# Patient Record
Sex: FEMALE | Race: WHITE | Employment: UNEMPLOYED | URBAN - METROPOLITAN AREA
[De-identification: names, ages, dates, MRNs, and addresses within clinical notes are randomized per-mention and may not be internally consistent; named-entity substitution may affect disease eponyms.]

---

## 2018-08-29 ENCOUNTER — OFFICE VISIT (OUTPATIENT)
Dept: FAMILY MEDICINE CLINIC | Age: 38
End: 2018-08-29

## 2018-08-29 ENCOUNTER — HOSPITAL ENCOUNTER (OUTPATIENT)
Dept: LAB | Age: 38
Discharge: HOME | End: 2018-08-29

## 2018-08-29 VITALS
HEIGHT: 62 IN | OXYGEN SATURATION: 98 % | WEIGHT: 206.2 LBS | TEMPERATURE: 98.3 F | RESPIRATION RATE: 16 BRPM | BODY MASS INDEX: 37.94 KG/M2 | HEART RATE: 84 BPM | SYSTOLIC BLOOD PRESSURE: 108 MMHG | DIASTOLIC BLOOD PRESSURE: 72 MMHG

## 2018-08-29 DIAGNOSIS — B00.1 RECURRENT COLD SORES: ICD-10-CM

## 2018-08-29 DIAGNOSIS — Z13.0 SCREENING FOR DEFICIENCY ANEMIA: ICD-10-CM

## 2018-08-29 DIAGNOSIS — R06.02 EXERCISE-INDUCED SHORTNESS OF BREATH: Primary | ICD-10-CM

## 2018-08-29 DIAGNOSIS — E03.4 HYPOTHYROIDISM DUE TO ACQUIRED ATROPHY OF THYROID: ICD-10-CM

## 2018-08-29 DIAGNOSIS — G43.009 MIGRAINE WITHOUT AURA AND WITHOUT STATUS MIGRAINOSUS, NOT INTRACTABLE: ICD-10-CM

## 2018-08-29 DIAGNOSIS — E78.5 HYPERLIPIDEMIA, UNSPECIFIED HYPERLIPIDEMIA TYPE: ICD-10-CM

## 2018-08-29 DIAGNOSIS — F17.200 SMOKING: ICD-10-CM

## 2018-08-29 DIAGNOSIS — R21 RASH: ICD-10-CM

## 2018-08-29 DIAGNOSIS — R73.01 IMPAIRED FASTING BLOOD SUGAR: ICD-10-CM

## 2018-08-29 DIAGNOSIS — R53.82 CHRONIC FATIGUE: ICD-10-CM

## 2018-08-29 DIAGNOSIS — Z23 NEED FOR PNEUMOCOCCAL VACCINATION: ICD-10-CM

## 2018-08-29 PROBLEM — E66.01 SEVERE OBESITY (BMI 35.0-39.9): Status: ACTIVE | Noted: 2018-08-29

## 2018-08-29 LAB — XX-LABCORP SPECIMEN COL,LCBCF: NORMAL

## 2018-08-29 PROCEDURE — 99001 SPECIMEN HANDLING PT-LAB: CPT | Performed by: FAMILY MEDICINE

## 2018-08-29 RX ORDER — ALBUTEROL SULFATE 90 UG/1
2 AEROSOL, METERED RESPIRATORY (INHALATION)
Qty: 1 INHALER | Refills: 2 | Status: SHIPPED | OUTPATIENT
Start: 2018-08-29 | End: 2019-09-30 | Stop reason: SDUPTHER

## 2018-08-29 RX ORDER — VALACYCLOVIR HYDROCHLORIDE 1 G/1
TABLET, FILM COATED ORAL
Qty: 20 TAB | Refills: 0 | Status: SHIPPED | OUTPATIENT
Start: 2018-08-29 | End: 2019-02-01 | Stop reason: SDUPTHER

## 2018-08-29 RX ORDER — TOPIRAMATE 25 MG/1
25 TABLET ORAL
Qty: 30 TAB | Refills: 2 | Status: SHIPPED | OUTPATIENT
Start: 2018-08-29 | End: 2019-05-08

## 2018-08-29 RX ORDER — EPINEPHRINE 0.3 MG/.3ML
0.3 INJECTION SUBCUTANEOUS
Qty: 1 SYRINGE | Refills: 0 | Status: SHIPPED | OUTPATIENT
Start: 2018-08-29 | End: 2018-08-29

## 2018-08-29 NOTE — MR AVS SNAPSHOT
[address] [id number] Patient: Johnathan Mora MRN:  [id number] Visit Information Date & Time Provider Department Dept. Phone Encounter #  
 8/29/2018  9:00 AM Melida Rodriguez, [facility] [phone] [id number] Follow-up Instructions Return in about 2 weeks (around 9/12/2018). Upcoming Health Maintenance Date Due Pneumococcal 19-64 Medium Risk (1 of 1 - PPSV23) 10/15/1999 PAP AKA CERVICAL CYTOLOGY 1/1/2016 Influenza Age 5 to Adult 8/1/2018 DTaP/Tdap/Td series (1 - Tdap) 6/2/2025* *Topic was postponed. The date shown is not the original due date. Allergies as of 8/29/2018  Review Complete On: 8/29/2018 By: Melida Rodriguez MD  
  
 Severity Noted Reaction Type Reactions Premarin [Conjugated Estrogens] Medium 03/18/2016   Systemic Rash Adhesive Tape-silicones  51/95/2786    Rash Mild rash/itching [place]  07/27/2017    Shortness of Breath SOB and wheezing Current Immunizations  Never Reviewed Name Date Influenza Vaccine 12/4/2013, 12/23/2011 12:00 AM  
 Influenza Vaccine Split 11/8/2012 Tdap 6/23/2015 12:00 AM, 12/23/2011 12:00 AM  
  
 Not reviewed this visit You Were Diagnosed With   
  
 Codes Comments Exercise-induced shortness of breath    -  Primary ICD-10-CM: R06.02 
ICD-9-CM: 786.05 Migraine without aura and without status migrainosus, not intractable     ICD-10-CM: W94.762 ICD-9-CM: 346.10 Recurrent cold sores     ICD-10-CM: B00.1 ICD-9-CM: 054.9 Hyperlipidemia, unspecified hyperlipidemia type     ICD-10-CM: E78.5 ICD-9-CM: 272.4 Impaired fasting blood sugar     ICD-10-CM: R73.01 
ICD-9-CM: 790.21 Screening for deficiency anemia     ICD-10-CM: Z13.0 ICD-9-CM: V78.1  Chronic fatigue     ICD-10-CM: R53.82 
ICD-9-CM: 780.79   
 Hypothyroidism due to acquired atrophy of thyroid     ICD-10-CM: E03.4 ICD-9-CM: 244.8, 246.8 Smoking     ICD-10-CM: F17.200 ICD-9-CM: 305.1 BMI 37.0-37.9, adult     ICD-10-CM: Z68.37 ICD-9-CM: V85.37 Rash     ICD-10-CM: R21 
ICD-9-CM: 782.1 both palm Vitals BP Pulse Temp Resp Height(growth percentile) Weight(growth percentile) 108/72 (BP 1 Location: Left arm, BP Patient Position: Sitting) 84 98.3 °F (36.8 °C) (Oral) 16 5' 2\" (1.575 m) 206 lb 3.2 oz (93.5 kg) SpO2 BMI OB Status Smoking Status 98% 37.71 kg/m2 IUD Current Every Day Smoker Vitals History BMI and BSA Data Body Mass Index Body Surface Area  
 37.71 kg/m 2 2.02 m 2 Preferred Pharmacy Pharmacy Name Phone [facility] [address] [facility], [facility] Your Updated Medication List  
  
   
This list is accurate as of 8/29/18  9:26 AM.  Always use your most recent med list.  
  
  
  
  
 albuterol 90 mcg/actuation inhaler Commonly known as:  PROVENTIL HFA, VENTOLIN HFA, PROAIR HFA Take 2 Puffs by inhalation every four (4) hours as needed for Wheezing. atorvastatin 20 mg tablet Commonly known as:  LIPITOR Take 1 Tab by mouth daily. butalbital-acetaminophen-caffeine -40 mg per tablet Commonly known as:  Federica Reasoner Take 1 Tab by mouth every four (4) hours as needed for Pain. cyclobenzaprine 10 mg tablet Commonly known as:  FLEXERIL Take 1 Tab by mouth nightly as needed for Muscle Spasm(s). EPINEPHrine 0.3 mg/0.3 mL injection Commonly known as:  EPIPEN  
0.3 mL by IntraMUSCular route once as needed for up to 1 dose. ibuprofen 400 mg tablet Commonly known as:  MOTRIN Take 1 Tab by mouth every eight (8) hours as needed for Pain.  
  
 levothyroxine 100 mcg tablet Commonly known as:  SYNTHROID Take 1 Tab by mouth Daily (before breakfast).   
  
 MIRENA 20 mcg/24 hr (5 years) Iud  
 Generic drug:  levonorgestrel 1 Each by IntraUTERine route once. predniSONE 20 mg tablet Commonly known as:  DELTASONE  
3 tabs by mouth once daily for 3 days 2 tabs by mouth once daily for 3 days 1 tab by mouth once daily for 3 days  
  
 topiramate 25 mg tablet Commonly known as:  TOPAMAX Take 1 Tab by mouth daily (with breakfast). TYLENOL ARTHRITIS PAIN 650 mg Boneta Erp Generic drug:  acetaminophen Take 650 mg by mouth every six (6) hours as needed for Pain. valACYclovir 1 gram tablet Commonly known as:  VALTREX  
1 gm daily for 3 days with acute episode of cold sore Prescriptions Sent to Pharmacy Refills  
 albuterol (PROVENTIL HFA, VENTOLIN HFA, PROAIR HFA) 90 mcg/actuation inhaler 2 Sig: Take 2 Puffs by inhalation every four (4) hours as needed for Wheezing. Class: Normal  
 Pharmacy: [address] Ph #: [phone] Route: Inhalation EPINEPHrine (EPIPEN) 0.3 mg/0.3 mL injection 0 Si.3 mL by IntraMUSCular route once as needed for up to 1 dose. Class: Normal  
 Pharmacy: [address] Ph #: [phone] Route: IntraMUSCular  
 topiramate (TOPAMAX) 25 mg tablet 2 Sig: Take 1 Tab by mouth daily (with breakfast). Class: Normal  
 Pharmacy: [address] Ph #: [phone] Route: Oral  
 valACYclovir (VALTREX) 1 gram tablet 0 Si gm daily for 3 days with acute episode of cold sore Class: Normal  
 Pharmacy: [address] Ph #: [phone] We Performed the Following REFERRAL TO DERMATOLOGY [REF19 Custom] Follow-up Instructions Return in about 2 weeks (around 2018). To-Do List   
 2018 Lab:  CBC W/O DIFF   
  
 2018 Lab:  HEMOGLOBIN A1C WITH EAG   
  
 2018 Lab:  LIPID PANEL   
  
 2018 Lab: METABOLIC PANEL, COMPREHENSIVE   
  
 08/29/2018 Lab:  T4, FREE   
  
 08/29/2018 Lab:  TSH 3RD GENERATION   
  
 08/29/2018 Lab:  VITAMIN D, 25 HYDROXY Referral Information Referral ID Referred By Referred To  
  
 [id number] Bonny Poole. [facility] [address] [place], [facility] [address] Phone: [phone] Fax: [phone] Visits Status Start Date End Date 1 New Request 8/29/18 8/29/19 If your referral has a status of pending review or denied, additional information will be sent to support the outcome of this decision. Patient Instructions Stopping Smoking: Care Instructions Your Care Instructions Cigarette smokers crave the nicotine in cigarettes. Giving it up is much harder than simply changing a habit. Your body has to stop craving the nicotine. It is hard to quit, but you can do it. There are many tools that people use to quit smoking. You may find that combining tools works best for you. There are several steps to quitting. First you get ready to quit. Then you get support to help you. After that, you learn new skills and behaviors to become a nonsmoker. For many people, a necessary step is getting and using medicine. Your doctor will help you set up the plan that best meets your needs. You may want to attend a smoking cessation program to help you quit smoking. When you choose a program, look for one that has proven success. Ask your doctor for ideas. You will greatly increase your chances of success if you take medicine as well as get counseling or join a cessation program. 
Some of the changes you feel when you first quit tobacco are uncomfortable. Your body will miss the nicotine at first, and you may feel short-tempered and grumpy. You may have trouble sleeping or concentrating. Medicine can help you deal with these symptoms.  You may struggle with changing your smoking habits and rituals. The last step is the tricky one: Be prepared for the smoking urge to continue for a time. This is a lot to deal with, but keep at it. You will feel better. Follow-up care is a key part of your treatment and safety. Be sure to make and go to all appointments, and call your doctor if you are having problems. It's also a good idea to know your test results and keep a list of the medicines you take. How can you care for yourself at home? · Ask your family, friends, and coworkers for support. You have a better chance of quitting if you have help and support. · Join a support group, such as Nicotine Anonymous, for people who are trying to quit smoking. · Consider signing up for a smoking cessation program, such as the American Lung Association's Freedom from Smoking program. 
· Get text messaging support. Go to the website at www.smokefree. gov to sign up for the [facility] program. 
· Set a quit date. Pick your date carefully so that it is not right in the middle of a big deadline or stressful time. Once you quit, do not even take a puff. Get rid of all ashtrays and lighters after your last cigarette. Clean your house and your clothes so that they do not smell of smoke. · Learn how to be a nonsmoker. Think about ways you can avoid those things that make you reach for a cigarette. ¨ Avoid situations that put you at greatest risk for smoking. For some people, it is hard to have a drink with friends without smoking. For others, they might skip a coffee break with coworkers who smoke. ¨ Change your daily routine. Take a different route to work or eat a meal in a different place. · Cut down on stress. Calm yourself or release tension by doing an activity you enjoy, such as reading a book, taking a hot bath, or gardening. · Talk to your doctor or pharmacist about nicotine replacement therapy, which replaces the nicotine in your body.  You still get nicotine but you do not use tobacco. Nicotine replacement products help you slowly reduce the amount of nicotine you need. These products come in several forms, many of them available over-the-counter: ¨ Nicotine patches ¨ Nicotine gum and lozenges ¨ Nicotine inhaler · Ask your doctor about bupropion (Wellbutrin) or varenicline (Chantix), which are prescription medicines. They do not contain nicotine. They help you by reducing withdrawal symptoms, such as stress and anxiety. · Some people find hypnosis, acupuncture, and massage helpful for ending the smoking habit. · Eat a healthy diet and get regular exercise. Having healthy habits will help your body move past its craving for nicotine. · Be prepared to keep trying. Most people are not successful the first few times they try to quit. Do not get mad at yourself if you smoke again. Make a list of things you learned and think about when you want to try again, such as next week, next month, or next year. Where can you learn more? Go to http://sachin-sae.info/. Enter G897 in the search box to learn more about \"Stopping Smoking: Care Instructions. \" Current as of: November 29, 2017 Content Version: 11.7 © 8411-0930 [employer]. Care instructions adapted under license by [employer] (which disclaims liability or warranty for this information). If you have questions about a medical condition or this instruction, always ask your healthcare professional. [address] any warranty or liability for your use of this information. Prediabetes: Care Instructions Your Care Instructions Prediabetes is a warning sign that you are at risk for getting type 2 diabetes. It means that your blood sugar is higher than it should be. The food you eat turns into sugar, which your body uses for energy.  Normally, an organ called the pancreas makes insulin, which allows the sugar in your blood to get into your body's cells. But when your body can't use insulin the right way, the sugar doesn't move into cells. It stays in your blood instead. This is called insulin resistance. The buildup of sugar in the blood causes prediabetes. The good news is that lifestyle changes may help you get your blood sugar back to normal and help you avoid or delay diabetes. Follow-up care is a key part of your treatment and safety. Be sure to make and go to all appointments, and call your doctor if you are having problems. It's also a good idea to know your test results and keep a list of the medicines you take. How can you care for yourself at home? · Watch your weight. A healthy weight helps your body use insulin properly. · Limit the amount of calories, sweets, and unhealthy fat you eat. Ask your doctor if you should see a dietitian. A registered dietitian can help you create meal plans that fit your lifestyle. · Get at least 30 minutes of exercise on most days of the week. Exercise helps control your blood sugar. It also helps you maintain a healthy weight. Walking is a good choice. You also may want to do other activities, such as running, swimming, cycling, or playing tennis or team sports. · Do not smoke. Smoking can make prediabetes worse. If you need help quitting, talk to your doctor about stop-smoking programs and medicines. These can increase your chances of quitting for good. · If your doctor prescribed medicines, take them exactly as prescribed. Call your doctor if you think you are having a problem with your medicine. You will get more details on the specific medicines your doctor prescribes. When should you call for help? Watch closely for changes in your health, and be sure to contact your doctor if: 
  · You have any symptoms of diabetes. These may include: ¨ Being thirsty more often. ¨ Urinating more. ¨ Being hungrier. ¨ Losing weight. ¨ Being very tired. ¨ Having blurry vision.   · You have a wound that will not heal.  
  · You have an infection that will not go away.  
  · You have problems with your blood pressure.  
  · You want more information about diabetes and how you can keep from getting it. Where can you learn more? Go to http://sachin-sae.info/. Enter I222 in the search box to learn more about \"Prediabetes: Care Instructions. \" Current as of: December 7, 2017 Content Version: 11.7 © 9923-4976 [employer]. Care instructions adapted under license by [employer] (which disclaims liability or warranty for this information). If you have questions about a medical condition or this instruction, always ask your healthcare professional. [address] any warranty or liability for your use of this information. Learning About Low-Fat Eating What is low-fat eating? Most food has some fat in it. Your body needs some fat to be healthy. But some kinds of fats are healthier than others. In a low-fat eating plan, you try to choose healthier fats and eat fewer unhealthy fats. Healthy fats include olive and canola oil. Try to avoid eating too much saturated fat (such as in cheese and meats) and trans fat (a type of fat found in many packaged snack foods and other baked goods). You do not need to cut all fat from your diet. But you can make healthier choices about the types and amount of fat you eat. Even though it is a good idea to choose healthier fats, it is still important to be careful of how much fat you eat, because all fats are high in calories. What are the different types of fats? Unhealthy fats · Saturated fat. Saturated fats are mostly in animal foods, such as meat and dairy foods. Tropical oils, such as coconut oil, palm oil, and cocoa butter, are also saturated fats. · Trans fat.  Trans fats include shortening, partially hydrogenated vegetable oils, and hydrogenated vegetable oils. Trans fats are made when a liquid fat is turned into a solid fat (for example, when corn oil is made into stick margarine). They are in many processed foods, such as cookies, crackers, and snack foods. Healthy fats · Monounsaturated fat. Monounsaturated fats are liquid at room temperature but get solid when refrigerated. Eating foods that are high in this fat may help lower your \"bad\" (LDL) cholesterol, keep your \"good\" (HDL) cholesterol level up, and lower your chances of getting coronary artery disease. This fat is found in canola oil, olive oil, peanut oil, olives, avocados, nuts, and nut butters. · Polyunsaturated fat. Polyunsaturated fats are liquid at room temperature. They are in safflower, sunflower, and corn oils. They are also the main fat in seafood. Omega-3 fatty acids are types of polyunsaturated fat. Eating fish may lower your chances of getting coronary artery disease. Fatty fish such as salmon and mackerel contain these healthy fatty acids. So do ground flaxseeds and flaxseed oil, soybeans, walnuts, and seeds. Why cut down on unhealthy fats? Eating foods that contain saturated fats can raise the LDL (\"bad\") cholesterol in your blood. Having a high level of LDL cholesterol increases your chance of hardening of the arteries (atherosclerosis), which can lead to heart disease, heart attack, and stroke. Trans fat raises the level of \"bad\" LDL cholesterol in your blood and may lower the \"good\" HDL cholesterol in your blood. Trans fat can raise your risk of heart disease, heart attack, and stroke. In general: · No more than 10% of your daily calories should come from saturated fat. This is about 20 grams in a 2,000-calorie diet. · No more than 10% of your daily calories should come from polyunsaturated fat. This is about 20 grams in a 2,000-calorie diet. · Monounsaturated fats can be up to 15% of your daily calories.  This is about 25 to 30 grams in a 2,000-calorie diet. If you're not sure how much fat you should be eating or how many calories you need each day to stay at a healthy weight, talk to a registered dietitian. He or she can help you create a plan that's right for you. What can you do to cut down on fat? Foods like cheese, butter, sausage, and desserts can have a lot of unhealthy fats. Try these tips for healthier meals at home and when you eat out. At home · Fill up on fruits, vegetables, and whole grains. · Think of meat as a side dish instead of as the main part of your meal. 
· When you do eat meat, make it extra-lean ground beef (97% lean), ground turkey breast (without skin added), meats with fat trimmed off before cooking, or skinless chicken. · Try main dishes that use whole wheat pasta, brown rice, dried beans, or vegetables. · Use cooking methods that use little or no fat, such as broiling, steaming, or grilling. Use cooking spray instead of oil. If you use oil, use a monounsaturated oil, such as canola or olive oil. · Read food labels on canned, bottled, or packaged foods. Choose those with little saturated fat and no trans fat. When eating out at a restaurant · Order foods that are broiled or poached instead of fried or breaded. · Cut back on the amount of butter or margarine that you use on bread. Use small amounts of olive oil instead. · Order sauces, gravies, and salad dressings on the side, and use only a little. · When you order pasta, choose tomato sauce instead of cream sauce. · Ask for salsa with your baked potato instead of sour cream, butter, cheese, or art. Where can you learn more? Go to http://sachin-sae.info/. Enter M034 in the search box to learn more about \"Learning About Low-Fat Eating. \" Current as of: May 12, 2017 Content Version: 11.7 © 3343-9287 Aniways[employer] Incorporated.  Care instructions adapted under license by [address] (which disclaims liability or warranty for this information). If you have questions about a medical condition or this instruction, always ask your healthcare professional. [address] any warranty or liability for your use of this information. Hypothyroidism: Care Instructions Your Care Instructions You have hypothyroidism, which means that your body is not making enough thyroid hormone. This hormone helps your body use energy. If your thyroid level is low, you may feel tired, be constipated, have an increase in your blood pressure, or have dry skin or memory problems. You may also get cold easily, even when it is warm. Women with low thyroid levels may have heavy menstrual periods. A blood test to find your thyroid-stimulating hormone (TSH) level is used to check for hypothyroidism. A high TSH level may mean that you have low thyroid. When your body is not making enough thyroid hormone, TSH levels rise in an effort to make the body produce more. The treatment for hypothyroidism is to take thyroid hormone pills. You should start to feel better in 1 to 2 weeks. But it can take several months to see changes in the TSH level. You will need regular visits with your doctor to make sure you have the right dose of medicine. Most people need treatment for the rest of their lives. You will need to see your doctor regularly to have blood tests and to make sure you are doing well. Follow-up care is a key part of your treatment and safety. Be sure to make and go to all appointments, and call your doctor if you are having problems. It's also a good idea to know your test results and keep a list of the medicines you take. How can you care for yourself at home? · Take your thyroid hormone medicine exactly as prescribed. Call your doctor if you think you are having a problem with your medicine.  Most people do not have side effects if they take the right amount of medicine regularly. ¨ Take the medicine 30 minutes before breakfast, and do not take it with calcium, vitamins, or iron. ¨ Do not take extra doses of your thyroid medicine. It will not help you get better any faster, and it may cause side effects. ¨ If you forget to take a dose, do NOT take a double dose of medicine. Take your usual dose the next day. · Tell your doctor about all prescription, herbal, or over-the-counter products you take. · Take care of yourself. Eat a healthy diet, get enough sleep, and get regular exercise. When should you call for help? Call 911 anytime you think you may need emergency care. For example, call if: 
  · You passed out (lost consciousness).  
  · You have severe trouble breathing.  
  · You have a very slow heartbeat (less than 60 beats a minute).  
  · You have a low body temperature (95°F or below).  
 Call your doctor now or seek immediate medical care if: 
  · You feel tired, sluggish, or weak.  
  · You have trouble remembering things or concentrating.  
  · You do not begin to feel better 2 weeks after starting your medicine.  
 Watch closely for changes in your health, and be sure to contact your doctor if you have any problems. Where can you learn more? Go to http://sachin-sae.info/. Enter O569 in the search box to learn more about \"Hypothyroidism: Care Instructions. \" Current as of: May 12, 2017 Content Version: 11.7 © 4894-1255 [employer], Incorporated. Care instructions adapted under license by [employer] (which disclaims liability or warranty for this information). If you have questions about a medical condition or this instruction, always ask your healthcare professional. [address] [address] any warranty or liability for your use of this information. Introducing [facility]! Alayna Orourke introduces Platogo patient portal. Now you can access parts of your medical record, email your doctor's office, and request medication refills online. 1. In your internet browser, go to [email or web address]://[employer] ([employer]). [employer]) 2. Click on the First Time User? Click Here link in the Sign In box. You will see the New Member Sign Up page. 3. Enter your Platogo Access Code exactly as it appears below. You will not need to use this code after youve completed the sign-up process. If you do not sign up before the expiration date, you must request a new code. · Platogo Access Code: [email or web address] Expires: 11/27/2018  9:04 AM 
 
4. Enter the last four digits of your Social Security Number (xxxx) and Date of Birth (mm/dd/yyyy) as indicated and click Submit. You will be taken to the next sign-up page. 5. Create a Platogo ID. This will be your Platogo login ID and cannot be changed, so think of one that is secure and easy to remember. 6. Create a Platogo password. You can change your password at any time. 7. Enter your Password Reset Question and Answer. This can be used at a later time if you forget your password. 8. Enter your e-mail address. You will receive e-mail notification when new information is available in [address] 9. Click Sign Up. You can now view and download portions of your medical record. 10. Click the Download Summary menu link to download a portable copy of your medical information. If you have questions, please visit the Frequently Asked Questions section of the Platogo website. Remember, Platogo is NOT to be used for urgent needs. For medical emergencies, dial 911. Now available from your iPhone and Android! Please provide this summary of care documentation to your next provider. Your primary care clinician is listed as Shanice Chery. If you have any questions after today's visit, please call [phone].

## 2018-08-29 NOTE — PATIENT INSTRUCTIONS
Stopping Smoking: Care Instructions Your Care Instructions Cigarette smokers crave the nicotine in cigarettes. Giving it up is much harder than simply changing a habit. Your body has to stop craving the nicotine. It is hard to quit, but you can do it. There are many tools that people use to quit smoking. You may find that combining tools works best for you. There are several steps to quitting. First you get ready to quit. Then you get support to help you. After that, you learn new skills and behaviors to become a nonsmoker. For many people, a necessary step is getting and using medicine. Your doctor will help you set up the plan that best meets your needs. You may want to attend a smoking cessation program to help you quit smoking. When you choose a program, look for one that has proven success. Ask your doctor for ideas. You will greatly increase your chances of success if you take medicine as well as get counseling or join a cessation program. 
Some of the changes you feel when you first quit tobacco are uncomfortable. Your body will miss the nicotine at first, and you may feel short-tempered and grumpy. You may have trouble sleeping or concentrating. Medicine can help you deal with these symptoms. You may struggle with changing your smoking habits and rituals. The last step is the tricky one: Be prepared for the smoking urge to continue for a time. This is a lot to deal with, but keep at it. You will feel better. Follow-up care is a key part of your treatment and safety. Be sure to make and go to all appointments, and call your doctor if you are having problems. It's also a good idea to know your test results and keep a list of the medicines you take. How can you care for yourself at home? · Ask your family, friends, and coworkers for support. You have a better chance of quitting if you have help and support.  
· Join a support group, such as Nicotine Anonymous, for people who are trying to quit smoking. · Consider signing up for a smoking cessation program, such as the American Lung Association's Freedom from Smoking program. 
· Get text messaging support. Go to the website at www.smokefree. gov to sign up for the [facility] program. 
· Set a quit date. Pick your date carefully so that it is not right in the middle of a big deadline or stressful time. Once you quit, do not even take a puff. Get rid of all ashtrays and lighters after your last cigarette. Clean your house and your clothes so that they do not smell of smoke. · Learn how to be a nonsmoker. Think about ways you can avoid those things that make you reach for a cigarette. ¨ Avoid situations that put you at greatest risk for smoking. For some people, it is hard to have a drink with friends without smoking. For others, they might skip a coffee break with coworkers who smoke. ¨ Change your daily routine. Take a different route to work or eat a meal in a different place. · Cut down on stress. Calm yourself or release tension by doing an activity you enjoy, such as reading a book, taking a hot bath, or gardening. · Talk to your doctor or pharmacist about nicotine replacement therapy, which replaces the nicotine in your body. You still get nicotine but you do not use tobacco. Nicotine replacement products help you slowly reduce the amount of nicotine you need. These products come in several forms, many of them available over-the-counter: ¨ Nicotine patches ¨ Nicotine gum and lozenges ¨ Nicotine inhaler · Ask your doctor about bupropion (Wellbutrin) or varenicline (Chantix), which are prescription medicines. They do not contain nicotine. They help you by reducing withdrawal symptoms, such as stress and anxiety. · Some people find hypnosis, acupuncture, and massage helpful for ending the smoking habit. · Eat a healthy diet and get regular exercise. Having healthy habits will help your body move past its craving for nicotine. · Be prepared to keep trying. Most people are not successful the first few times they try to quit. Do not get mad at yourself if you smoke again. Make a list of things you learned and think about when you want to try again, such as next week, next month, or next year. Where can you learn more? Go to http://sachin-sae.info/. Enter N052 in the search box to learn more about \"Stopping Smoking: Care Instructions. \" Current as of: November 29, 2017 Content Version: 11.7 © 4859-1574 [employer]. Care instructions adapted under license by [employer] (which disclaims liability or warranty for this information). If you have questions about a medical condition or this instruction, always ask your healthcare professional. [address] any warranty or liability for your use of this information. Prediabetes: Care Instructions Your Care Instructions Prediabetes is a warning sign that you are at risk for getting type 2 diabetes. It means that your blood sugar is higher than it should be. The food you eat turns into sugar, which your body uses for energy. Normally, an organ called the pancreas makes insulin, which allows the sugar in your blood to get into your body's cells. But when your body can't use insulin the right way, the sugar doesn't move into cells. It stays in your blood instead. This is called insulin resistance. The buildup of sugar in the blood causes prediabetes. The good news is that lifestyle changes may help you get your blood sugar back to normal and help you avoid or delay diabetes. Follow-up care is a key part of your treatment and safety. Be sure to make and go to all appointments, and call your doctor if you are having problems. It's also a good idea to know your test results and keep a list of the medicines you take. How can you care for yourself at home? · Watch your weight.  A healthy weight helps your body use insulin properly. · Limit the amount of calories, sweets, and unhealthy fat you eat. Ask your doctor if you should see a dietitian. A registered dietitian can help you create meal plans that fit your lifestyle. · Get at least 30 minutes of exercise on most days of the week. Exercise helps control your blood sugar. It also helps you maintain a healthy weight. Walking is a good choice. You also may want to do other activities, such as running, swimming, cycling, or playing tennis or team sports. · Do not smoke. Smoking can make prediabetes worse. If you need help quitting, talk to your doctor about stop-smoking programs and medicines. These can increase your chances of quitting for good. · If your doctor prescribed medicines, take them exactly as prescribed. Call your doctor if you think you are having a problem with your medicine. You will get more details on the specific medicines your doctor prescribes. When should you call for help? Watch closely for changes in your health, and be sure to contact your doctor if: 
  · You have any symptoms of diabetes. These may include: ¨ Being thirsty more often. ¨ Urinating more. ¨ Being hungrier. ¨ Losing weight. ¨ Being very tired. ¨ Having blurry vision.  
  · You have a wound that will not heal.  
  · You have an infection that will not go away.  
  · You have problems with your blood pressure.  
  · You want more information about diabetes and how you can keep from getting it. Where can you learn more? Go to http://sachin-sae.info/. Enter I222 in the search box to learn more about \"Prediabetes: Care Instructions. \" Current as of: December 7, 2017 Content Version: 11.7 © 4108-6188 [email or web address]. Care instructions adapted under license by [employer] (which disclaims liability or warranty for this information).  If you have questions about a medical condition or this instruction, always ask your healthcare professional. [place] [address] any warranty or liability for your use of this information. Learning About Low-Fat Eating What is low-fat eating? Most food has some fat in it. Your body needs some fat to be healthy. But some kinds of fats are healthier than others. In a low-fat eating plan, you try to choose healthier fats and eat fewer unhealthy fats. Healthy fats include olive and canola oil. Try to avoid eating too much saturated fat (such as in cheese and meats) and trans fat (a type of fat found in many packaged snack foods and other baked goods). You do not need to cut all fat from your diet. But you can make healthier choices about the types and amount of fat you eat. Even though it is a good idea to choose healthier fats, it is still important to be careful of how much fat you eat, because all fats are high in calories. What are the different types of fats? Unhealthy fats · Saturated fat. Saturated fats are mostly in animal foods, such as meat and dairy foods. Tropical oils, such as coconut oil, palm oil, and cocoa butter, are also saturated fats. · Trans fat. Trans fats include shortening, partially hydrogenated vegetable oils, and hydrogenated vegetable oils. Trans fats are made when a liquid fat is turned into a solid fat (for example, when corn oil is made into stick margarine). They are in many processed foods, such as cookies, crackers, and snack foods. Healthy fats · Monounsaturated fat. Monounsaturated fats are liquid at room temperature but get solid when refrigerated. Eating foods that are high in this fat may help lower your \"bad\" (LDL) cholesterol, keep your \"good\" (HDL) cholesterol level up, and lower your chances of getting coronary artery disease. This fat is found in canola oil, olive oil, peanut oil, olives, avocados, nuts, and nut butters. · Polyunsaturated fat.  Polyunsaturated fats are liquid at room temperature. They are in safflower, sunflower, and corn oils. They are also the main fat in seafood. Omega-3 fatty acids are types of polyunsaturated fat. Eating fish may lower your chances of getting coronary artery disease. Fatty fish such as salmon and mackerel contain these healthy fatty acids. So do ground flaxseeds and flaxseed oil, soybeans, walnuts, and seeds. Why cut down on unhealthy fats? Eating foods that contain saturated fats can raise the LDL (\"bad\") cholesterol in your blood. Having a high level of LDL cholesterol increases your chance of hardening of the arteries (atherosclerosis), which can lead to heart disease, heart attack, and stroke. Trans fat raises the level of \"bad\" LDL cholesterol in your blood and may lower the \"good\" HDL cholesterol in your blood. Trans fat can raise your risk of heart disease, heart attack, and stroke. In general: · No more than 10% of your daily calories should come from saturated fat. This is about 20 grams in a 2,000-calorie diet. · No more than 10% of your daily calories should come from polyunsaturated fat. This is about 20 grams in a 2,000-calorie diet. · Monounsaturated fats can be up to 15% of your daily calories. This is about 25 to 30 grams in a 2,000-calorie diet. If you're not sure how much fat you should be eating or how many calories you need each day to stay at a healthy weight, talk to a registered dietitian. He or she can help you create a plan that's right for you. What can you do to cut down on fat? Foods like cheese, butter, sausage, and desserts can have a lot of unhealthy fats. Try these tips for healthier meals at home and when you eat out. At home · Fill up on fruits, vegetables, and whole grains.  
· Think of meat as a side dish instead of as the main part of your meal. 
· When you do eat meat, make it extra-lean ground beef (97% lean), ground turkey breast (without skin added), meats with fat trimmed off before cooking, or skinless chicken. · Try main dishes that use whole wheat pasta, brown rice, dried beans, or vegetables. · Use cooking methods that use little or no fat, such as broiling, steaming, or grilling. Use cooking spray instead of oil. If you use oil, use a monounsaturated oil, such as canola or olive oil. · Read food labels on canned, bottled, or packaged foods. Choose those with little saturated fat and no trans fat. When eating out at a restaurant · Order foods that are broiled or poached instead of fried or breaded. · Cut back on the amount of butter or margarine that you use on bread. Use small amounts of olive oil instead. · Order sauces, gravies, and salad dressings on the side, and use only a little. · When you order pasta, choose tomato sauce instead of cream sauce. · Ask for salsa with your baked potato instead of sour cream, butter, cheese, or art. Where can you learn more? Go to http://sachin-sae.info/. Enter V263 in the search box to learn more about \"Learning About Low-Fat Eating. \" Current as of: May 12, 2017 Content Version: 11.7 © 7650-8725 [employer]. Care instructions adapted under license by [employer] (which disclaims liability or warranty for this information). If you have questions about a medical condition or this instruction, always ask your healthcare professional. [place] [address] any warranty or liability for your use of this information. Hypothyroidism: Care Instructions Your Care Instructions You have hypothyroidism, which means that your body is not making enough thyroid hormone. This hormone helps your body use energy. If your thyroid level is low, you may feel tired, be constipated, have an increase in your blood pressure, or have dry skin or memory problems. You may also get cold easily, even when it is warm. Women with low thyroid levels may have heavy menstrual periods. A blood test to find your thyroid-stimulating hormone (TSH) level is used to check for hypothyroidism. A high TSH level may mean that you have low thyroid. When your body is not making enough thyroid hormone, TSH levels rise in an effort to make the body produce more. The treatment for hypothyroidism is to take thyroid hormone pills. You should start to feel better in 1 to 2 weeks. But it can take several months to see changes in the TSH level. You will need regular visits with your doctor to make sure you have the right dose of medicine. Most people need treatment for the rest of their lives. You will need to see your doctor regularly to have blood tests and to make sure you are doing well. Follow-up care is a key part of your treatment and safety. Be sure to make and go to all appointments, and call your doctor if you are having problems. It's also a good idea to know your test results and keep a list of the medicines you take. How can you care for yourself at home? · Take your thyroid hormone medicine exactly as prescribed. Call your doctor if you think you are having a problem with your medicine. Most people do not have side effects if they take the right amount of medicine regularly. ¨ Take the medicine 30 minutes before breakfast, and do not take it with calcium, vitamins, or iron. ¨ Do not take extra doses of your thyroid medicine. It will not help you get better any faster, and it may cause side effects. ¨ If you forget to take a dose, do NOT take a double dose of medicine. Take your usual dose the next day. · Tell your doctor about all prescription, herbal, or over-the-counter products you take. · Take care of yourself. Eat a healthy diet, get enough sleep, and get regular exercise. When should you call for help? Call 911 anytime you think you may need emergency care. For example, call if: 
  · You passed out (lost consciousness).  
  · You have severe trouble breathing.   · You have a very slow heartbeat (less than 60 beats a minute).  
  · You have a low body temperature (95°F or below).  
 Call your doctor now or seek immediate medical care if: 
  · You feel tired, sluggish, or weak.  
  · You have trouble remembering things or concentrating.  
  · You do not begin to feel better 2 weeks after starting your medicine.  
 Watch closely for changes in your health, and be sure to contact your doctor if you have any problems. Where can you learn more? Go to http://sachin-sae.info/. Enter G808 in the search box to learn more about \"Hypothyroidism: Care Instructions. \" Current as of: May 12, 2017 Content Version: 11.7 © 3685-0641 [employer]. Care instructions adapted under license by [employer] (which disclaims liability or warranty for this information). If you have questions about a medical condition or this instruction, always ask your healthcare professional. [place] [address] any warranty or liability for your use of this information. Pneumococcal Polysaccharide Vaccine: Care Instructions Your Care Instructions The pneumococcal polysaccharide vaccine (PPSV) can prevent some of the serious complications of pneumonia. This includes infection in the bloodstream (bacteremia) or throughout the body (septicemia). PPSV is recommended for people ages 72 years and older. People ages 3 to 59 who have a long-term illness should also get the vaccine. This includes people with diabetes, heart disease, liver disease, or lung disease. PPSV can also help people who have a weakened immune system. This includes cancer patients and people who don't have a spleen. The immune system helps your body fight infection and other illnesses. PPSV is given as a shot. It's usually given in the arm. Healthy older adults get the shot once. Other people may need to have a second dose.  The shot may cause pain and redness at the site. It may also cause a mild fever for a short time. Follow-up care is a key part of your treatment and safety. Be sure to make and go to all appointments, and call your doctor if you are having problems. It's also a good idea to know your test results and keep a list of the medicines you take. How can you care for yourself at home? · Take an over-the-counter pain medicine, such as acetaminophen (Tylenol), ibuprofen (Advil, Motrin), or naproxen (Aleve), if your arm is sore after the shot. Be safe with medicines. Read and follow all instructions on the label. · Give acetaminophen (Tylenol) or ibuprofen (Advil, Motrin) to your child for pain or fussiness after the shot. Read and follow all instructions on the label. Do not give aspirin to anyone younger than 20. It has been linked to Reye syndrome, a serious illness. · Put ice or a cold pack on the sore area for 10 to 20 minutes at a time. Put a thin cloth between the ice and your skin. When should you call for help? Call 911 anytime you think you may need emergency care. For example, call if: 
  · You have a seizure.  
  · You have symptoms of a severe allergic reaction. These may include: 
¨ Sudden raised, red areas (hives) all over the body. ¨ Swelling of the throat, mouth, lips, or tongue. ¨ Trouble breathing. ¨ Passing out (losing consciousness). Or you may feel very lightheaded or suddenly feel weak, confused, or restless.  
 Call your doctor now or seek immediate medical care if: 
  · You have symptoms of an allergic reaction, such as: ¨ A rash or hives (raised, red areas on the skin). ¨ Itching. ¨ Swelling. ¨ Belly pain, nausea, or vomiting.  
  · You have a high fever.  
 Watch closely for changes in your health, and be sure to contact your doctor if you have any problems. Where can you learn more? Go to http://sachin-sae.info/. Enter T225 in the search box to learn more about \"Pneumococcal Polysaccharide Vaccine: Care Instructions. \" Current as of: October 6, 2017 Content Version: 11.7 © 6213-3338 [employer], [employer]. Care instructions adapted under license by [employer] (which disclaims liability or warranty for this information). If you have questions about a medical condition or this instruction, always ask your healthcare professional. [place] [address] any warranty or liability for your use of this information.

## 2018-08-29 NOTE — PROGRESS NOTES
1. Have you been to the ER, urgent care clinic since your last visit? Hospitalized since your last visit? [facility] feet; told it was hand/foot/mouth disease 3/2018. She still has problems with bumps on hands and feet. 2. Have you seen or consulted any other health care providers outside of the [facility] since your last visit? Include any pap smears or colon screening. [facility] 2/2018 for IUD placement. Patient states her IUD is not Mirena but another brand; she's not sure what brand. Last pap smear 2/2018 - [facility] - normal exam as per patient. Patient has not had flu vaccine. Patient has not had a pneumonia vaccine.

## 2018-08-29 NOTE — PROGRESS NOTES
HISTORY OF PRESENT ILLNESS Rosemary Bauman is a 40 y.o. female. HPI: not seen pt since 2016. H.o exercise induce breathing discomfort. Using albuterol as needed. No wheezing or cough. No excessive use of albuterol. Does smoke. Not ready to quit at this time. H/o migraine headache. Topamax was helping. Wanted to restart and need refill. No side effect of medication. H./o recurrent cold sore and needed valacyclovir refill. H/o hyperlipidemia. Not on any medication. discussed high BMI. Discussed diet modification, calorie count and exercise. Discussed importance of weight loss. agree to do exercise and life style modification. Diet and exercise hand out given in AVS. Also h/o prediabetes. Repeat labs. Discussed diet modification. H./o hypothyroidism. Has stopped medication since long time. Now feeling fatigue , weight gain, hair loss and skin dryness. Wanted to recheck labs. No mood changes. Visit Vitals  /72 (BP 1 Location: Left arm, BP Patient Position: Sitting)  Pulse 84  Temp 98.3 °F (36.8 °C) (Oral)  Resp 16  
 Ht 5' 2\" (1.575 m)  Wt 206 lb 3.2 oz (93.5 kg)  SpO2 98%  BMI 37.71 kg/m2 H/o rash over both palm since last couple of months. Went to urgent care and was told it is hand foot and mouth disease and was given antibiotic. I have discuss with her that it is usually viral infection. currently has scattered rash over both palm and some dry skin. Will consider sending to dermatology. ROS: see HPI Physical Exam  
Constitutional: She is oriented to person, place, and time. No distress. Neck: No thyromegaly present. Cardiovascular: Normal rate, regular rhythm and normal heart sounds. Pulmonary/Chest: CTA Abdominal: Soft. Bowel sounds are normal. There is no tenderness. Musculoskeletal: She exhibits no edema. Lymphadenopathy:  
  She has no cervical adenopathy. Neurological: She is oriented to person, place, and time. Skin: Rash (erythmatous rash over both palm , non tender , no itching. no open skin ) noted. Psychiatric: Her behavior is normal.  
 
 
ASSESSMENT and PLAN 
  ICD-10-CM ICD-9-CM 1. Exercise-induced shortness of breath: discussed smoking cessation. Given albuterol refill. R06.02 786.05 albuterol (PROVENTIL HFA, VENTOLIN HFA, PROAIR HFA) 90 mcg/actuation inhaler 2. Migraine without aura and without status migrainosus, not intractable: restarted topamax. Discussed medication side effects. G43.009 346.10 topiramate (TOPAMAX) 25 mg tablet METABOLIC PANEL, COMPREHENSIVE 3. Recurrent cold sores: given refill on medication to take as needed. B00.1 054.9 valACYclovir (VALTREX) 1 gram tablet METABOLIC PANEL, COMPREHENSIVE 4. Hyperlipidemia, unspecified hyperlipidemia type: recheck labs. Z39.7 982.8 METABOLIC PANEL, COMPREHENSIVE  
   LIPID PANEL  
5. Impaired fasting blood sugar: recheck labs. G73.61 244.66 METABOLIC PANEL, COMPREHENSIVE  
   HEMOGLOBIN A1C WITH EAG 6. Screening for deficiency anemia Z13.0 V78.1 CBC W/O DIFF 7. Chronic fatigue: checking labs. R53.82 780.79 CBC W/O DIFF  
   VITAMIN D, 25 HYDROXY 8. Hypothyroidism due to acquired atrophy of thyroid: off medication since more than a year. Symptomatic. Will consider restarting medication after lab result. E03.4 244.8 TSH 3RD GENERATION  
  246.8 T4, FREE 9. Smoking: done counseling for smoking cessation. She will think about it. F17.200 305.1 PNEUMOCOCCAL POLYSACCHARIDE VACCINE, 23-VALENT, ADULT OR IMMUNOSUPPRESSED PT DOSE, 10. BMI 37.0-37.9, adult: discussed high BMI. Discussed diet modification, calorie count and exercise. Discussed importance of weight loss. agree to do exercise and life style modification. Diet and exercise hand out given in AVS. Z68.37 V85.37   
11. Rash: probably hand foot and mouth disease. Sending to dermatology . R21 782.1 REFERRAL TO DERMATOLOGY  
 both palm 12. Need for pneumococcal vaccination Z23 V03.82 PNEUMOCOCCAL POLYSACCHARIDE VACCINE, 23-VALENT, ADULT OR IMMUNOSUPPRESSED PT DOSE, Pt understood and agree with the plan Review HM Follow-up Disposition: 
Return in about 2 weeks (around 9/12/2018).

## 2018-08-30 DIAGNOSIS — R79.89 ELEVATED TSH: Primary | ICD-10-CM

## 2018-08-30 DIAGNOSIS — E03.4 HYPOTHYROIDISM DUE TO ACQUIRED ATROPHY OF THYROID: ICD-10-CM

## 2018-08-30 LAB
25(OH)D3+25(OH)D2 SERPL-MCNC: 21.1 NG/ML (ref 30–100)
ALBUMIN SERPL-MCNC: 4.5 G/DL (ref 3.5–5.5)
ALBUMIN/GLOB SERPL: 1.7 {RATIO} (ref 1.2–2.2)
ALP SERPL-CCNC: 81 IU/L (ref 39–117)
ALT SERPL-CCNC: 17 IU/L (ref 0–32)
AST SERPL-CCNC: 22 IU/L (ref 0–40)
BILIRUB SERPL-MCNC: 0.3 MG/DL (ref 0–1.2)
BUN SERPL-MCNC: 12 MG/DL (ref 6–20)
BUN/CREAT SERPL: 18 (ref 9–23)
CALCIUM SERPL-MCNC: 9.3 MG/DL (ref 8.7–10.2)
CHLORIDE SERPL-SCNC: 102 MMOL/L (ref 96–106)
CHOLEST SERPL-MCNC: 255 MG/DL (ref 100–199)
CO2 SERPL-SCNC: 21 MMOL/L (ref 20–29)
CREAT SERPL-MCNC: 0.68 MG/DL (ref 0.57–1)
ERYTHROCYTE [DISTWIDTH] IN BLOOD BY AUTOMATED COUNT: 13.9 % (ref 12.3–15.4)
EST. AVERAGE GLUCOSE BLD GHB EST-MCNC: 123 MG/DL
GLOBULIN SER CALC-MCNC: 2.6 G/DL (ref 1.5–4.5)
GLUCOSE SERPL-MCNC: 98 MG/DL (ref 65–99)
HBA1C MFR BLD: 5.9 % (ref 4.8–5.6)
HCT VFR BLD AUTO: 43.4 % (ref 34–46.6)
HDLC SERPL-MCNC: 28 MG/DL
HGB BLD-MCNC: 14.3 G/DL (ref 11.1–15.9)
INTERPRETATION, 910389: NORMAL
LDLC SERPL CALC-MCNC: 170 MG/DL (ref 0–99)
MCH RBC QN AUTO: 29.3 PG (ref 26.6–33)
MCHC RBC AUTO-ENTMCNC: 32.9 G/DL (ref 31.5–35.7)
MCV RBC AUTO: 89 FL (ref 79–97)
PLATELET # BLD AUTO: 275 X10E3/UL (ref 150–379)
POTASSIUM SERPL-SCNC: 4.6 MMOL/L (ref 3.5–5.2)
PROT SERPL-MCNC: 7.1 G/DL (ref 6–8.5)
RBC # BLD AUTO: 4.88 X10E6/UL (ref 3.77–5.28)
SODIUM SERPL-SCNC: 139 MMOL/L (ref 134–144)
T4 FREE SERPL-MCNC: 0.83 NG/DL (ref 0.82–1.77)
TRIGL SERPL-MCNC: 283 MG/DL (ref 0–149)
TSH SERPL DL<=0.005 MIU/L-ACNC: 5.92 UIU/ML (ref 0.45–4.5)
VLDLC SERPL CALC-MCNC: 57 MG/DL (ref 5–40)
WBC # BLD AUTO: 7.3 X10E3/UL (ref 3.4–10.8)

## 2018-08-30 RX ORDER — LEVOTHYROXINE SODIUM 25 UG/1
25 TABLET ORAL
Qty: 30 TAB | Refills: 1 | Status: SHIPPED | OUTPATIENT
Start: 2018-08-30 | End: 2018-10-30 | Stop reason: SDUPTHER

## 2018-08-30 NOTE — PROGRESS NOTES
Let pt know that elevated triglyceride and now restarted statin. Will continue current plan and further discussion on follow up visit in 2 wks. Please advise pt to keep an appt. Also elevated TSH. Need to restart synthroid low dose. Will restart it and again repeat labs in 6 wks. Prediabetes and will discuss further on follow up visit. Thanks.

## 2018-09-05 NOTE — PROGRESS NOTES
Contacted patient and verified identity using name and date of birth (2- identifiers) Spoke with patient and she verbalized understanding of elevated triglycerides (with recent statin restart) and further discussion at follow-up appt. Restart Levothyroxine and repeat labs in 6 weeks.

## 2018-09-17 ENCOUNTER — OFFICE VISIT (OUTPATIENT)
Dept: FAMILY MEDICINE CLINIC | Age: 38
End: 2018-09-17

## 2018-09-17 VITALS
SYSTOLIC BLOOD PRESSURE: 116 MMHG | RESPIRATION RATE: 16 BRPM | HEIGHT: 62 IN | HEART RATE: 84 BPM | OXYGEN SATURATION: 98 % | BODY MASS INDEX: 37.47 KG/M2 | TEMPERATURE: 98.8 F | DIASTOLIC BLOOD PRESSURE: 70 MMHG | WEIGHT: 203.6 LBS

## 2018-09-17 DIAGNOSIS — R73.01 IMPAIRED FASTING BLOOD SUGAR: ICD-10-CM

## 2018-09-17 DIAGNOSIS — M25.561 RIGHT KNEE PAIN, UNSPECIFIED CHRONICITY: ICD-10-CM

## 2018-09-17 DIAGNOSIS — E78.5 HYPERLIPIDEMIA, UNSPECIFIED HYPERLIPIDEMIA TYPE: ICD-10-CM

## 2018-09-17 DIAGNOSIS — E03.9 HYPOTHYROIDISM, UNSPECIFIED TYPE: ICD-10-CM

## 2018-09-17 DIAGNOSIS — E55.9 VITAMIN D DEFICIENCY: ICD-10-CM

## 2018-09-17 DIAGNOSIS — Z23 ENCOUNTER FOR IMMUNIZATION: ICD-10-CM

## 2018-09-17 DIAGNOSIS — R51.9 FREQUENT HEADACHES: Primary | ICD-10-CM

## 2018-09-17 RX ORDER — ERGOCALCIFEROL 1.25 MG/1
50000 CAPSULE ORAL
Qty: 12 CAP | Refills: 0 | Status: SHIPPED | OUTPATIENT
Start: 2018-09-17 | End: 2019-02-01 | Stop reason: SDUPTHER

## 2018-09-17 NOTE — PROGRESS NOTES
1. Have you been to the ER, urgent care clinic since your last visit? Hospitalized since your last visit? No    2. Have you seen or consulted any other health care providers outside of the [address] [place] [address] since your last visit? Include any pap smears or colon screening. No    [facility]/2018 [facility]    Patient requests flu vaccine today.

## 2018-09-17 NOTE — PATIENT INSTRUCTIONS
High Cholesterol: Care Instructions  Your Care Instructions    Cholesterol is a type of fat in your blood. It is needed for many body functions, such as making new cells. Cholesterol is made by your body. It also comes from food you eat. High cholesterol means that you have too much of the fat in your blood. This raises your risk of a heart attack and stroke. LDL and HDL are part of your total cholesterol. LDL is the \"bad\" cholesterol. High LDL can raise your risk for heart disease, heart attack, and stroke. HDL is the \"good\" cholesterol. It helps clear bad cholesterol from the body. High HDL is linked with a lower risk of heart disease, heart attack, and stroke. Your cholesterol levels help your doctor find out your risk for having a heart attack or stroke. You and your doctor can talk about whether you need to lower your risk and what treatment is best for you. A heart-healthy lifestyle along with medicines can help lower your cholesterol and your risk. The way you choose to lower your risk will depend on how high your risk is for heart attack and stroke. It will also depend on how you feel about taking medicines. Follow-up care is a key part of your treatment and safety. Be sure to make and go to all appointments, and call your doctor if you are having problems. It's also a good idea to know your test results and keep a list of the medicines you take. How can you care for yourself at home? · Eat a variety of foods every day. Good choices include fruits, vegetables, whole grains (like oatmeal), dried beans and peas, nuts and seeds, soy products (like tofu), and fat-free or low-fat dairy products. · Replace butter, margarine, and hydrogenated or partially hydrogenated oils with olive and canola oils. (Canola oil margarine without trans fat is fine.)  · Replace red meat with fish, poultry, and soy protein (like tofu). · Limit processed and packaged foods like chips, crackers, and cookies.   · Bake, broil, or steam foods. Don't melendez them. · Be physically active. Get at least 30 minutes of exercise on most days of the week. Walking is a good choice. You also may want to do other activities, such as running, swimming, cycling, or playing tennis or team sports. · Stay at a healthy weight or lose weight by making the changes in eating and physical activity listed above. Losing just a small amount of weight, even 5 to 10 pounds, can reduce your risk for having a heart attack or stroke. · Do not smoke. When should you call for help? Watch closely for changes in your health, and be sure to contact your doctor if:    · You need help making lifestyle changes.     · You have questions about your medicine. Where can you learn more? Go to http://sachin-sae.info/. Enter M410 in the search box to learn more about \"High Cholesterol: Care Instructions. \"  Current as of: May 10, 2017  Content Version: 11.7  © 4884-0070 [employer]. Care instructions adapted under license by [employer] (which disclaims liability or warranty for this information). If you have questions about a medical condition or this instruction, always ask your healthcare professional. [address] any warranty or liability for your use of this information. Prediabetes: Care Instructions  Your Care Instructions    Prediabetes is a warning sign that you are at risk for getting type 2 diabetes. It means that your blood sugar is higher than it should be. The food you eat turns into sugar, which your body uses for energy. Normally, an organ called the pancreas makes insulin, which allows the sugar in your blood to get into your body's cells. But when your body can't use insulin the right way, the sugar doesn't move into cells. It stays in your blood instead. This is called insulin resistance. The buildup of sugar in the blood causes prediabetes.   The good news is that lifestyle changes may help you get your blood sugar back to normal and help you avoid or delay diabetes. Follow-up care is a key part of your treatment and safety. Be sure to make and go to all appointments, and call your doctor if you are having problems. It's also a good idea to know your test results and keep a list of the medicines you take. How can you care for yourself at home? · Watch your weight. A healthy weight helps your body use insulin properly. · Limit the amount of calories, sweets, and unhealthy fat you eat. Ask your doctor if you should see a dietitian. A registered dietitian can help you create meal plans that fit your lifestyle. · Get at least 30 minutes of exercise on most days of the week. Exercise helps control your blood sugar. It also helps you maintain a healthy weight. Walking is a good choice. You also may want to do other activities, such as running, swimming, cycling, or playing tennis or team sports. · Do not smoke. Smoking can make prediabetes worse. If you need help quitting, talk to your doctor about stop-smoking programs and medicines. These can increase your chances of quitting for good. · If your doctor prescribed medicines, take them exactly as prescribed. Call your doctor if you think you are having a problem with your medicine. You will get more details on the specific medicines your doctor prescribes. When should you call for help? Watch closely for changes in your health, and be sure to contact your doctor if:    · You have any symptoms of diabetes. These may include:  ¨ Being thirsty more often. ¨ Urinating more. ¨ Being hungrier. ¨ Losing weight. ¨ Being very tired. ¨ Having blurry vision.     · You have a wound that will not heal.     · You have an infection that will not go away.     · You have problems with your blood pressure.     · You want more information about diabetes and how you can keep from getting it. Where can you learn more?   Go to http://sachin-sae.info/. Enter I222 in the search box to learn more about \"Prediabetes: Care Instructions. \"  Current as of: December 7, 2017  Content Version: 11.7  © 2006-2018 [employer]. Care instructions adapted under license by [employer] (which disclaims liability or warranty for this information). If you have questions about a medical condition or this instruction, always ask your healthcare professional. [address] any warranty or liability for your use of this information. Learning About Low-Fat Eating  What is low-fat eating? Most food has some fat in it. Your body needs some fat to be healthy. But some kinds of fats are healthier than others. In a low-fat eating plan, you try to choose healthier fats and eat fewer unhealthy fats. Healthy fats include olive and canola oil. Try to avoid eating too much saturated fat (such as in cheese and meats) and trans fat (a type of fat found in many packaged snack foods and other baked goods). You do not need to cut all fat from your diet. But you can make healthier choices about the types and amount of fat you eat. Even though it is a good idea to choose healthier fats, it is still important to be careful of how much fat you eat, because all fats are high in calories. What are the different types of fats? Unhealthy fats  · Saturated fat. Saturated fats are mostly in animal foods, such as meat and dairy foods. Tropical oils, such as coconut oil, palm oil, and cocoa butter, are also saturated fats. · Trans fat. Trans fats include shortening, partially hydrogenated vegetable oils, and hydrogenated vegetable oils. Trans fats are made when a liquid fat is turned into a solid fat (for example, when corn oil is made into stick margarine). They are in many processed foods, such as cookies, crackers, and snack foods. Healthy fats  · Monounsaturated fat.  Monounsaturated fats are liquid at room temperature but get solid when refrigerated. Eating foods that are high in this fat may help lower your \"bad\" (LDL) cholesterol, keep your \"good\" (HDL) cholesterol level up, and lower your chances of getting coronary artery disease. This fat is found in canola oil, olive oil, peanut oil, olives, avocados, nuts, and nut butters. · Polyunsaturated fat. Polyunsaturated fats are liquid at room temperature. They are in safflower, sunflower, and corn oils. They are also the main fat in seafood. Omega-3 fatty acids are types of polyunsaturated fat. Eating fish may lower your chances of getting coronary artery disease. Fatty fish such as salmon and mackerel contain these healthy fatty acids. So do ground flaxseeds and flaxseed oil, soybeans, walnuts, and seeds. Why cut down on unhealthy fats? Eating foods that contain saturated fats can raise the LDL (\"bad\") cholesterol in your blood. Having a high level of LDL cholesterol increases your chance of hardening of the arteries (atherosclerosis), which can lead to heart disease, heart attack, and stroke. Trans fat raises the level of \"bad\" LDL cholesterol in your blood and may lower the \"good\" HDL cholesterol in your blood. Trans fat can raise your risk of heart disease, heart attack, and stroke. In general:  · No more than 10% of your daily calories should come from saturated fat. This is about 20 grams in a 2,000-calorie diet. · No more than 10% of your daily calories should come from polyunsaturated fat. This is about 20 grams in a 2,000-calorie diet. · Monounsaturated fats can be up to 15% of your daily calories. This is about 25 to 30 grams in a 2,000-calorie diet. If you're not sure how much fat you should be eating or how many calories you need each day to stay at a healthy weight, talk to a registered dietitian. He or she can help you create a plan that's right for you. What can you do to cut down on fat?   Foods like cheese, butter, sausage, and desserts can have a lot of unhealthy fats. Try these tips for healthier meals at home and when you eat out. At home  · Fill up on fruits, vegetables, and whole grains. · Think of meat as a side dish instead of as the main part of your meal.  · When you do eat meat, make it extra-lean ground beef (97% lean), ground turkey breast (without skin added), meats with fat trimmed off before cooking, or skinless chicken. · Try main dishes that use whole wheat pasta, brown rice, dried beans, or vegetables. · Use cooking methods that use little or no fat, such as broiling, steaming, or grilling. Use cooking spray instead of oil. If you use oil, use a monounsaturated oil, such as canola or olive oil. · Read food labels on canned, bottled, or packaged foods. Choose those with little saturated fat and no trans fat. When eating out at a restaurant  · Order foods that are broiled or poached instead of fried or breaded. · Cut back on the amount of butter or margarine that you use on bread. Use small amounts of olive oil instead. · Order sauces, gravies, and salad dressings on the side, and use only a little. · When you order pasta, choose tomato sauce instead of cream sauce. · Ask for salsa with your baked potato instead of sour cream, butter, cheese, or art. Where can you learn more? Go to http://sachin-sae.info/. Enter I355 in the search box to learn more about \"Learning About Low-Fat Eating. \"  Current as of: May 12, 2017  Content Version: 11.7  © 0580-1532 [employer]. Care instructions adapted under license by [employer] (which disclaims liability or warranty for this information). If you have questions about a medical condition or this instruction, always ask your healthcare professional. [place] [address] any warranty or liability for your use of this information.        Learning About Low-Carbohydrate Diets for Weight Loss  What is a low-carbohydrate diet?  Low-carb diets avoid foods that are high in carbohydrate. These high-carb foods include pasta, bread, rice, cereal, fruits, and starchy vegetables. Instead, these diets usually have you eat foods that are high in fat and protein. Many people lose weight quickly on a low-carb diet. But the early weight loss is water. People on this diet often gain the weight back after they start eating carbs again. Not all diet plans are safe or work well. A lot of the evidence shows that low-carb diets aren't healthy. That's because these diets often don't include healthy foods like fruits and vegetables. Losing weight safely means balancing protein, fat, and carbs with every meal and snack. And low-carb diets don't always provide the vitamins, minerals, and fiber you need. If you have a serious medical condition, talk to your doctor before you try any diet. These conditions include kidney disease, heart disease, type 2 diabetes, high cholesterol, and high blood pressure. If you are pregnant, it may not be safe for your baby if you are on a low-carb diet. How can you lose weight safely? You might have heard that a diet plan helped another person lose weight. But that doesn't mean that it will work for you. It is very hard to stay on a diet that includes lots of big changes in your eating habits. If you want to get to a healthy weight and stay there, making healthy lifestyle changes will often work better than dieting. These steps can help. · Make a plan for change. Work with your doctor to create a plan that is right for you. · See a dietitian. He or she can show you how to make healthy changes in your eating habits. · Manage stress. If you have a lot of stress in your life, it can be hard to focus on making healthy changes to your daily habits. · Track your food and activity. You are likely to do better at losing weight if you keep track of what you eat and what you do.   Follow-up care is a key part of your treatment and safety. Be sure to make and go to all appointments, and call your doctor if you are having problems. It's also a good idea to know your test results and keep a list of the medicines you take. Where can you learn more? Go to http://sachin-sae.info/. Enter A121 in the search box to learn more about \"Learning About Low-Carbohydrate Diets for Weight Loss. \"  Current as of: May 12, 2017  Content Version: 11.7  © 9882-2300 [employer]. Care instructions adapted under license by [employer] (which disclaims liability or warranty for this information). If you have questions about a medical condition or this instruction, always ask your healthcare professional. [address] any warranty or liability for your use of this information. Migraine Headache: Care Instructions  Your Care Instructions  Migraines are painful, throbbing headaches that often start on one side of the head. They may cause nausea and vomiting and make you sensitive to light, sound, or smell. Without treatment, migraines can last from 4 hours to a few days. Medicines can help prevent migraines or stop them after they have started. Your doctor can help you find which ones work best for you. Follow-up care is a key part of your treatment and safety. Be sure to make and go to all appointments, and call your doctor if you are having problems. It's also a good idea to know your test results and keep a list of the medicines you take. How can you care for yourself at home? · Do not drive if you have taken a prescription pain medicine. · Rest in a quiet, dark room until your headache is gone. Close your eyes, and try to relax or go to sleep. Don't watch TV or read. · Put a cold, moist cloth or cold pack on the painful area for 10 to 20 minutes at a time. Put a thin cloth between the cold pack and your skin.   · Use a warm, moist towel or a heating pad set on low to relax tight shoulder and neck muscles. · Have someone gently massage your neck and shoulders. · Take your medicines exactly as prescribed. Call your doctor if you think you are having a problem with your medicine. You will get more details on the specific medicines your doctor prescribes. · Be careful not to take pain medicine more often than the instructions allow. You could get worse or more frequent headaches when the medicine wears off. To prevent migraines  · Keep a headache diary so you can figure out what triggers your headaches. Avoiding triggers may help you prevent headaches. Record when each headache began, how long it lasted, and what the pain was like. (Was it throbbing, aching, stabbing, or dull?) Write down any other symptoms you had with the headache, such as nausea, flashing lights or dark spots, or sensitivity to bright light or loud noise. Note if the headache occurred near your period. List anything that might have triggered the headache. Triggers may include certain foods (chocolate, cheese, wine) or odors, smoke, bright light, stress, or lack of sleep. · If your doctor has prescribed medicine for your migraines, take it as directed. You may have medicine that you take only when you get a migraine and medicine that you take all the time to help prevent migraines. ¨ If your doctor has prescribed medicine for when you get a headache, take it at the first sign of a migraine, unless your doctor has given you other instructions. ¨ If your doctor has prescribed medicine to prevent migraines, take it exactly as prescribed. Call your doctor if you think you are having a problem with your medicine. · Find healthy ways to deal with stress. Migraines are most common during or right after stressful times. Take time to relax before and after you do something that has caused a migraine in the past.  · Try to keep your muscles relaxed by keeping good posture.  Check your jaw, face, neck, and shoulder muscles for tension. Try to relax them. When you sit at a desk, change positions often. And make sure to stretch for 30 seconds each hour. · Get plenty of sleep and exercise. · Eat meals on a regular schedule. Avoid foods and drinks that often trigger migraines. These include chocolate, alcohol (especially red wine and port), aspartame, monosodium glutamate (MSG), and some additives found in foods (such as hot dogs, art, cold cuts, aged cheeses, and pickled foods). · Limit caffeine. Don't drink too much coffee, tea, or soda. But don't quit caffeine suddenly. That can also give you migraines. · Do not smoke or allow others to smoke around you. If you need help quitting, talk to your doctor about stop-smoking programs and medicines. These can increase your chances of quitting for good. · If you are taking birth control pills or hormone therapy, talk to your doctor about whether they are triggering your migraines. When should you call for help? Call 911 anytime you think you may need emergency care. For example, call if:    · You have signs of a stroke. These may include:  ¨ Sudden numbness, paralysis, or weakness in your face, arm, or leg, especially on only one side of your body. ¨ Sudden vision changes. ¨ Sudden trouble speaking. ¨ Sudden confusion or trouble understanding simple statements. ¨ Sudden problems with walking or balance. ¨ A sudden, severe headache that is different from past headaches.    Call your doctor now or seek immediate medical care if:    · You have new or worse nausea and vomiting.     · You have a new or higher fever.     · Your headache gets much worse.    Watch closely for changes in your health, and be sure to contact your doctor if:    · You are not getting better after 2 days (48 hours). Where can you learn more? Go to http://sachin-sae.info/. Enter Q513 in the search box to learn more about \"Migraine Headache: Care Instructions. \"  Current as of: October 9, 2017  Content Version: 11.7  © 2771-0389 [employer]. Care instructions adapted under license by [employer] (which disclaims liability or warranty for this information). If you have questions about a medical condition or this instruction, always ask your healthcare professional. [address] any warranty or liability for your use of this information. Influenza (Flu) Vaccine (Inactivated or Recombinant): What You Need to Know  Why get vaccinated? Influenza (\"flu\") is a contagious disease that spreads around the [place] every winter, usually between October and May. Flu is caused by influenza viruses and is spread mainly by coughing, sneezing, and close contact. Anyone can get flu. Flu strikes suddenly and can last several days. Symptoms vary by age, but can include:  · Fever/chills. · Sore throat. · Muscle aches. · Fatigue. · Cough. · Headache. · Runny or stuffy nose. Flu can also lead to pneumonia and blood infections, and cause diarrhea and seizures in children. If you have a medical condition, such as heart or lung disease, flu can make it worse. Flu is more dangerous for some people. Infants and young children, people 72years of age and older, pregnant women, and people with certain health conditions or a weakened immune system are at greatest risk. Each year thousands of people in the [facility] die from flu, and many more are hospitalized. Flu vaccine can:  · Keep you from getting flu. · Make flu less severe if you do get it. · Keep you from spreading flu to your family and other people. Inactivated and recombinant flu vaccines  A dose of flu vaccine is recommended every flu season. Children 6 months through 6years of age may need two doses during the same flu season. Everyone else needs only one dose each flu season.   Some inactivated flu vaccines contain a very small amount of a mercury-based preservative called thimerosal. Studies have not shown thimerosal in vaccines to be harmful, but flu vaccines that do not contain thimerosal are available. There is no live flu virus in flu shots. They cannot cause the flu. There are many flu viruses, and they are always changing. Each year a new flu vaccine is made to protect against three or four viruses that are likely to cause disease in the upcoming flu season. But even when the vaccine doesn't exactly match these viruses, it may still provide some protection. Flu vaccine cannot prevent:  · Flu that is caused by a virus not covered by the vaccine. · Illnesses that look like flu but are not. Some people should not get this vaccine  Tell the person who is giving you the vaccine:  · If you have any severe (life-threatening) allergies. If you ever had a life-threatening allergic reaction after a dose of flu vaccine, or have a severe allergy to any part of this vaccine, you may be advised not to get vaccinated. Most, but not all, types of flu vaccine contain a small amount of egg protein. · If you ever had Guillain-Barré syndrome (also called GBS) Some people with a history of GBS should not get this vaccine. This should be discussed with your doctor. · If you are not feeling well. It is usually okay to get flu vaccine when you have a mild illness, but you might be asked to come back when you feel better. Risks of a vaccine reaction  With any medicine, including vaccines, there is a chance of reactions. These are usually mild and go away on their own, but serious reactions are also possible. Most people who get a flu shot do not have any problems with it. Minor problems following a flu shot include:  · Soreness, redness, or swelling where the shot was given  · Hoarseness  · Sore, red or itchy eyes  · Cough  · Fever  · Aches  · Headache  · Itching  · Fatigue  If these problems occur, they usually begin soon after the shot and last 1 or 2 days.   More serious problems following a flu shot can include the following:  · There may be a small increased risk of Guillain-Barré Syndrome (GBS) after inactivated flu vaccine. This risk has been estimated at 1 or 2 additional cases per million people vaccinated. This is much lower than the risk of severe complications from flu, which can be prevented by flu vaccine. · Brenda Beard children who get the flu shot along with pneumococcal vaccine (PCV13) and/or DTaP vaccine at the same time might be slightly more likely to have a seizure caused by fever. Ask your doctor for more information. Tell your doctor if a child who is getting flu vaccine has ever had a seizure  Problems that could happen after any injected vaccine:  · People sometimes faint after a medical procedure, including vaccination. Sitting or lying down for about 15 minutes can help prevent fainting, and injuries caused by a fall. Tell your doctor if you feel dizzy, or have vision changes or ringing in the ears. · Some people get severe pain in the shoulder and have difficulty moving the arm where a shot was given. This happens very rarely. · Any medication can cause a severe allergic reaction. Such reactions from a vaccine are very rare, estimated at about 1 in a million doses, and would happen within a few minutes to a few hours after the vaccination. As with any medicine, there is a very remote chance of a vaccine causing a serious injury or death. The safety of vaccines is always being monitored. For more information, visit: www.cdc.gov/vaccinesafety/. What if there is a serious reaction? What should I look for? · Look for anything that concerns you, such as signs of a severe allergic reaction, very high fever, or unusual behavior. Signs of a severe allergic reaction can include hives, swelling of the face and throat, difficulty breathing, a fast heartbeat, dizziness, and weakness - usually within a few minutes to a few hours after the vaccination. What should I do?   · If you think it is a severe allergic reaction or other emergency that can't wait, call 9-1-1 and get the person to the nearest hospital. Otherwise, call your doctor. · Reactions should be reported to the \"Vaccine Adverse Event Reporting System\" (VAERS). Your doctor should file this report, or you can do it yourself through the VAERS website at www.vaers. [facility].gov, or by calling 1-263.235.7979. VAERS does not give medical advice. The National Vaccine Injury Compensation Program  The National Vaccine Injury Compensation Program (VICP) is a federal program that was created to compensate people who may have been injured by certain vaccines. Persons who believe they may have been injured by a vaccine can learn about the program and about filing a claim by calling 9-425.883.4598 or visiting the [employer] website at www.[facility].gov/vaccinecompensation. There is a time limit to file a claim for compensation. How can I learn more? · Ask your healthcare provider. He or she can give you the vaccine package insert or suggest other sources of information. · Call your local or state health department. · Contact the Centers for Disease Control and Prevention (CDC):  ¨ Call 0-124.970.5306 (1-800-CDC-INFO) or  ¨ Visit CDC's website at www.cdc.gov/flu  Vaccine Information Statement  Inactivated Influenza Vaccine  8/7/2015)  42 OSITOBaldo Tineo 732NU-32  Department of Health and Human Services  Centers for Disease Control and Prevention  Many Vaccine Information Statements are available in [place] and other languages. See www.immunize.org/vis. Muchas hojas de información sobre vacunas están disponibles en español y en otros idiomas. Visite www.immunize.org/vis. Care instructions adapted under license by [employer] (which disclaims liability or warranty for this information).  If you have questions about a medical condition or this instruction, always ask your healthcare professional. [address] any warranty or liability for your use of this information.

## 2018-09-17 NOTE — PROGRESS NOTES
Patient presents for flu vaccine. Consent obtained, Tolerated procedure well at right deltoid. Patient remained in office 10 minutes post vaccine. No side effects noted.      Lot #  [id number]  exp 06/12/2019  [address] [place] [address]: [id number]

## 2018-09-17 NOTE — PROGRESS NOTES
HISTORY OF PRESENT ILLNESS  Calvin Seay is a 40 y.o. female. HPI: here for follow up and discuss lab results. Noted elevated LDL. She was given statin. Has not started it yet as not picked up from the pharmacy. Working on weight loss and has lost 3 lbs since last visit few weeks ago. Feeling better. Also trying diet modification. Discussed side effects of statin and advised to take it at bedtime. Also h/o hypothyroidism. She was not taking medication since long time. Now started her on low dose of  Synthroid. She is trying to be complaint. Discuss to take it at empty stomach. She understood it well. Chronic fatigue. Some improvement. Also noted low vitamin D. She has not started supplement yet. Will give medication. Discussed prediabetes. Discussed importance of weight loss, diet modification and exercise again. She is working on it. Visit Vitals    /70 (BP 1 Location: Right arm, BP Patient Position: Sitting)    Pulse 84    Temp 98.8 °F (37.1 °C) (Oral)    Resp 16    Ht 5' 2\" (1.575 m)    Wt 203 lb 9.6 oz (92.4 kg)    SpO2 98%    BMI 37.24 kg/m2     Review medication list, vitals, problem list,allergies. Denies any headache, dizziness, no chest pain or trouble breathing, no arm or leg weakness. No nausea or vomiting, no weight or appetite changes, no depression or anxiety. No urine or bowel complains, no palpitation, no diaphoresis.    Lab Results   Component Value Date/Time    WBC 7.3 08/29/2018 12:00 AM    HGB 14.3 08/29/2018 12:00 AM    HCT 43.4 08/29/2018 12:00 AM    PLATELET 742 13/89/3413 12:00 AM    MCV 89 08/29/2018 12:00 AM     Lab Results   Component Value Date/Time    Sodium 139 08/29/2018 12:00 AM    Potassium 4.6 08/29/2018 12:00 AM    Chloride 102 08/29/2018 12:00 AM    CO2 21 08/29/2018 12:00 AM    Anion gap 15.0 10/15/2015 10:00 AM    Glucose 98 08/29/2018 12:00 AM    BUN 12 08/29/2018 12:00 AM    Creatinine 0.68 08/29/2018 12:00 AM    BUN/Creatinine ratio 18 08/29/2018 12:00 AM    GFR est  08/29/2018 12:00 AM    GFR est non- 08/29/2018 12:00 AM    Calcium 9.3 08/29/2018 12:00 AM    Bilirubin, total 0.3 08/29/2018 12:00 AM    AST (SGOT) 22 08/29/2018 12:00 AM    Alk. phosphatase 81 08/29/2018 12:00 AM    Protein, total 7.1 08/29/2018 12:00 AM    Albumin 4.5 08/29/2018 12:00 AM    Globulin 2.5 10/15/2015 10:00 AM    A-G Ratio 1.7 08/29/2018 12:00 AM    ALT (SGPT) 17 08/29/2018 12:00 AM     Lab Results   Component Value Date/Time    Cholesterol, total 255 (H) 08/29/2018 12:00 AM    HDL Cholesterol 28 (L) 08/29/2018 12:00 AM    LDL, calculated 170 (H) 08/29/2018 12:00 AM    VLDL, calculated 57 (H) 08/29/2018 12:00 AM    Triglyceride 283 (H) 08/29/2018 12:00 AM     Lab Results   Component Value Date/Time    TSH 5.920 (H) 08/29/2018 12:00 AM     Lab Results   Component Value Date/Time    Hemoglobin A1c 5.9 (H) 08/29/2018 12:00 AM     Lab Results   Component Value Date/Time    VITAMIN D, 25-HYDROXY 21.1 (L) 08/29/2018 12:00 AM     Also frequent headaches . almost every day and more over frontal area. Mild to moderate intensity. She was given Topamax. Has not started yet. Has discussed medication side effects. Asymptomatic during visit. C/o rt knee pain. No swelling. Localized pain over tibial tuberosity. Affects her ambulation. No weakness in lower ext. Pain is moderate in intensity when occurs. No swelling or redness. ROS: see HPI     Physical Exam   Constitutional: She is oriented to person, place, and time. No distress. Neck: No thyromegaly present. Cardiovascular: Normal rate, regular rhythm and normal heart sounds. Pulmonary/Chest:   CTA   Abdominal: Soft. Bowel sounds are normal. There is no tenderness. Musculoskeletal: She exhibits no edema. Rt knee: localized tenderness over tibial  Tuberosity. No swelling or redness. ROM wnl. Lymphadenopathy:     She has no cervical adenopathy. Neurological: She is oriented to person, place, and time. Psychiatric: Her behavior is normal.       ASSESSMENT and PLAN    ICD-10-CM ICD-9-CM    1. Frequent headaches: will start Topamax. Discussed side effects of medication again. F/u next visit. Discussed to be complaint with medication. She had financial limitation so was not able to picked up her medications except taking synthroid. F23 269.4 METABOLIC PANEL, COMPREHENSIVE   2. Hyperlipidemia, unspecified hyperlipidemia type: will start statin. Discussed to take it at bedtime and discussed medication side effects. Diet modification ,exercise and weight loss importance discussed. E78.5 272.4 LIPID PANEL      METABOLIC PANEL, COMPREHENSIVE   3. Hypothyroidism, unspecified type: restarted synthroid low dose. Repeat labs before next visit. E03.9 244.9 TSH 3RD GENERATION   4. Impaired fasting blood sugar: Discussed importance of weight loss, diet modification and exercise again. She is working on it.    R73.01 790.21    5. Vitamin D deficiency: started her on supplement. Recheck labs before next visit. E55.9 268.9 ergocalciferol (DRISDOL) 50,000 unit capsule      VITAMIN D, 25 HYDROXY   6. Right knee pain, unspecified chronicity: on and off . Over tibial tuberosity. Discussed to continue ice application. Tylenol or motrin as needed for pain. If no improvement make a sooner appt 4 wks. M25.561 719.46    Pt understood and agree with the plan   Review    Follow-up Disposition:  Return in about 3 months (around 12/17/2018).

## 2018-09-17 NOTE — MR AVS SNAPSHOT
[address] [facility] [address] [facility] 
[phone] Patient: Jalil Oconnell MRN:  [id number] Visit Information Date & Time Provider Department Dept. Phone Encounter #  
 9/17/2018 11:00 AM Rehana Durbintzer, [address] [facility] [phone] [id number] Follow-up Instructions Return in about 3 months (around 12/17/2018). Upcoming Health Maintenance Date Due  
 PAP AKA CERVICAL CYTOLOGY 1/1/2016 Influenza Age 5 to Adult 8/1/2018 DTaP/Tdap/Td series (3 - Td) 6/23/2025 Allergies as of 9/17/2018  Review Complete On: 9/17/2018 By: Jess Sterling Severity Noted Reaction Type Reactions Premarin [Conjugated Estrogens] Medium 03/18/2016   Systemic Rash Adhesive Tape-silicones  30/80/2269    Rash Mild rash/itching [place]  07/27/2017    Shortness of Breath SOB and wheezing Current Immunizations  Never Reviewed Name Date Influenza Vaccine 12/4/2013, 12/23/2011 12:00 AM  
 Influenza Vaccine Split 11/8/2012 Pneumococcal Polysaccharide (PPSV-23) 8/29/2018 Tdap 6/23/2015 12:00 AM, 12/23/2011 12:00 AM  
  
 Not reviewed this visit You Were Diagnosed With   
  
 Codes Comments Frequent headaches    -  Primary ICD-10-CM: O95 ICD-9-CM: 784.0 Hyperlipidemia, unspecified hyperlipidemia type     ICD-10-CM: E78.5 ICD-9-CM: 272.4 Hypothyroidism, unspecified type     ICD-10-CM: E03.9 ICD-9-CM: 244.9 Impaired fasting blood sugar     ICD-10-CM: R73.01 
ICD-9-CM: 790.21 Vitamin D deficiency     ICD-10-CM: E55.9 ICD-9-CM: 268.9 Right knee pain, unspecified chronicity     ICD-10-CM: M25.561 ICD-9-CM: 719.46 Vitals BP Pulse Temp Resp Height(growth percentile) Weight(growth percentile) 116/70 (BP 1 Location: Right arm, BP Patient Position: Sitting) 84 98.8 °F (37.1 °C) (Oral) 16 5' 2\" (1.575 m) 203 lb 9.6 oz (92.4 kg) LMP SpO2 BMI OB Status Smoking Status 09/03/2018 98% 37.24 kg/m2 IUD Current Every Day Smoker BMI and BSA Data Body Mass Index Body Surface Area  
 37.24 kg/m 2 2.01 m 2 Preferred Pharmacy Pharmacy Name Phone Emilia Griggs [address], [address] [facility] Your Updated Medication List  
  
   
This list is accurate as of 9/17/18 11:33 AM.  Always use your most recent med list.  
  
  
  
  
 albuterol 90 mcg/actuation inhaler Commonly known as:  PROVENTIL HFA, VENTOLIN HFA, PROAIR HFA Take 2 Puffs by inhalation every four (4) hours as needed for Wheezing. atorvastatin 20 mg tablet Commonly known as:  LIPITOR Take 1 Tab by mouth daily. butalbital-acetaminophen-caffeine -40 mg per tablet Commonly known as:  Micaela Riddles Take 1 Tab by mouth every four (4) hours as needed for Pain. cyclobenzaprine 10 mg tablet Commonly known as:  FLEXERIL Take 1 Tab by mouth nightly as needed for Muscle Spasm(s). ergocalciferol 50,000 unit capsule Commonly known as:  DRISDOL Take 1 Cap by mouth every seven (7) days. ibuprofen 400 mg tablet Commonly known as:  MOTRIN Take 1 Tab by mouth every eight (8) hours as needed for Pain.  
  
 levothyroxine 25 mcg tablet Commonly known as:  SYNTHROID Take 1 Tab by mouth Daily (before breakfast). MIRENA 20 mcg/24 hr (5 years) Iud  
Generic drug:  levonorgestrel 1 Each by IntraUTERine route once. predniSONE 20 mg tablet Commonly known as:  DELTASONE  
3 tabs by mouth once daily for 3 days 2 tabs by mouth once daily for 3 days 1 tab by mouth once daily for 3 days  
  
 topiramate 25 mg tablet Commonly known as:  TOPAMAX Take 1 Tab by mouth daily (with breakfast). TYLENOL ARTHRITIS PAIN 650 mg Darrius Brown Generic drug:  acetaminophen Take 650 mg by mouth every six (6) hours as needed for Pain. valACYclovir 1 gram tablet Commonly known as:  VALTREX  
1 gm daily for 3 days with acute episode of cold sore Prescriptions Sent to Pharmacy Refills  
 ergocalciferol (DRISDOL) 50,000 unit capsule 0 Sig: Take 1 Cap by mouth every seven (7) days. Class: Normal  
 Pharmacy: [address] Ph #: [phone] Route: Oral  
  
Follow-up Instructions Return in about 3 months (around 12/17/2018). To-Do List   
 09/17/2018 Lab:  METABOLIC PANEL, COMPREHENSIVE   
  
 12/17/2018 Lab:  LIPID PANEL   
  
 12/17/2018 Lab:  TSH 3RD GENERATION   
  
 12/17/2018 Lab:  VITAMIN D, 25 HYDROXY Patient Instructions High Cholesterol: Care Instructions Your Care Instructions Cholesterol is a type of fat in your blood. It is needed for many body functions, such as making new cells. Cholesterol is made by your body. It also comes from food you eat. High cholesterol means that you have too much of the fat in your blood. This raises your risk of a heart attack and stroke. LDL and HDL are part of your total cholesterol. LDL is the \"bad\" cholesterol. High LDL can raise your risk for heart disease, heart attack, and stroke. HDL is the \"good\" cholesterol. It helps clear bad cholesterol from the body. High HDL is linked with a lower risk of heart disease, heart attack, and stroke. Your cholesterol levels help your doctor find out your risk for having a heart attack or stroke. You and your doctor can talk about whether you need to lower your risk and what treatment is best for you. A heart-healthy lifestyle along with medicines can help lower your cholesterol and your risk. The way you choose to lower your risk will depend on how high your risk is for heart attack and stroke. It will also depend on how you feel about taking medicines. Follow-up care is a key part of your treatment and safety.  Be sure to make and go to all appointments, and call your doctor if you are having problems. It's also a good idea to know your test results and keep a list of the medicines you take. How can you care for yourself at home? · Eat a variety of foods every day. Good choices include fruits, vegetables, whole grains (like oatmeal), dried beans and peas, nuts and seeds, soy products (like tofu), and fat-free or low-fat dairy products. · Replace butter, margarine, and hydrogenated or partially hydrogenated oils with olive and canola oils. (Canola oil margarine without trans fat is fine.) · Replace red meat with fish, poultry, and soy protein (like tofu). · Limit processed and packaged foods like chips, crackers, and cookies. · Bake, broil, or steam foods. Don't melendez them. · Be physically active. Get at least 30 minutes of exercise on most days of the week. Walking is a good choice. You also may want to do other activities, such as running, swimming, cycling, or playing tennis or team sports. · Stay at a healthy weight or lose weight by making the changes in eating and physical activity listed above. Losing just a small amount of weight, even 5 to 10 pounds, can reduce your risk for having a heart attack or stroke. · Do not smoke. When should you call for help? Watch closely for changes in your health, and be sure to contact your doctor if: 
  · You need help making lifestyle changes.  
  · You have questions about your medicine. Where can you learn more? Go to http://sachin-sae.info/. Enter Y352 in the search box to learn more about \"High Cholesterol: Care Instructions. \" Current as of: May 10, 2017 Content Version: 11.7 © 3589-8556 [employer]. Care instructions adapted under license by [employer] (which disclaims liability or warranty for this information).  If you have questions about a medical condition or this instruction, always ask your healthcare professional. [address] any warranty or liability for your use of this information. Prediabetes: Care Instructions Your Care Instructions Prediabetes is a warning sign that you are at risk for getting type 2 diabetes. It means that your blood sugar is higher than it should be. The food you eat turns into sugar, which your body uses for energy. Normally, an organ called the pancreas makes insulin, which allows the sugar in your blood to get into your body's cells. But when your body can't use insulin the right way, the sugar doesn't move into cells. It stays in your blood instead. This is called insulin resistance. The buildup of sugar in the blood causes prediabetes. The good news is that lifestyle changes may help you get your blood sugar back to normal and help you avoid or delay diabetes. Follow-up care is a key part of your treatment and safety. Be sure to make and go to all appointments, and call your doctor if you are having problems. It's also a good idea to know your test results and keep a list of the medicines you take. How can you care for yourself at home? · Watch your weight. A healthy weight helps your body use insulin properly. · Limit the amount of calories, sweets, and unhealthy fat you eat. Ask your doctor if you should see a dietitian. A registered dietitian can help you create meal plans that fit your lifestyle. · Get at least 30 minutes of exercise on most days of the week. Exercise helps control your blood sugar. It also helps you maintain a healthy weight. Walking is a good choice. You also may want to do other activities, such as running, swimming, cycling, or playing tennis or team sports. · Do not smoke. Smoking can make prediabetes worse. If you need help quitting, talk to your doctor about stop-smoking programs and medicines. These can increase your chances of quitting for good. · If your doctor prescribed medicines, take them exactly as prescribed. Call your doctor if you think you are having a problem with your medicine. You will get more details on the specific medicines your doctor prescribes. When should you call for help? Watch closely for changes in your health, and be sure to contact your doctor if: 
  · You have any symptoms of diabetes. These may include: ¨ Being thirsty more often. ¨ Urinating more. ¨ Being hungrier. ¨ Losing weight. ¨ Being very tired. ¨ Having blurry vision.  
  · You have a wound that will not heal.  
  · You have an infection that will not go away.  
  · You have problems with your blood pressure.  
  · You want more information about diabetes and how you can keep from getting it. Where can you learn more? Go to http://sachin-sae.info/. Enter I222 in the search box to learn more about \"Prediabetes: Care Instructions. \" Current as of: December 7, 2017 Content Version: 11.7 © 2696-1742 [employer]. Care instructions adapted under license by [employer] (which disclaims liability or warranty for this information). If you have questions about a medical condition or this instruction, always ask your healthcare professional. [address] any warranty or liability for your use of this information. Learning About Low-Fat Eating What is low-fat eating? Most food has some fat in it. Your body needs some fat to be healthy. But some kinds of fats are healthier than others. In a low-fat eating plan, you try to choose healthier fats and eat fewer unhealthy fats. Healthy fats include olive and canola oil. Try to avoid eating too much saturated fat (such as in cheese and meats) and trans fat (a type of fat found in many packaged snack foods and other baked goods). You do not need to cut all fat from your diet.  But you can make healthier choices about the types and amount of fat you eat. Even though it is a good idea to choose healthier fats, it is still important to be careful of how much fat you eat, because all fats are high in calories. What are the different types of fats? Unhealthy fats · Saturated fat. Saturated fats are mostly in animal foods, such as meat and dairy foods. Tropical oils, such as coconut oil, palm oil, and cocoa butter, are also saturated fats. · Trans fat. Trans fats include shortening, partially hydrogenated vegetable oils, and hydrogenated vegetable oils. Trans fats are made when a liquid fat is turned into a solid fat (for example, when corn oil is made into stick margarine). They are in many processed foods, such as cookies, crackers, and snack foods. Healthy fats · Monounsaturated fat. Monounsaturated fats are liquid at room temperature but get solid when refrigerated. Eating foods that are high in this fat may help lower your \"bad\" (LDL) cholesterol, keep your \"good\" (HDL) cholesterol level up, and lower your chances of getting coronary artery disease. This fat is found in canola oil, olive oil, peanut oil, olives, avocados, nuts, and nut butters. · Polyunsaturated fat. Polyunsaturated fats are liquid at room temperature. They are in safflower, sunflower, and corn oils. They are also the main fat in seafood. Omega-3 fatty acids are types of polyunsaturated fat. Eating fish may lower your chances of getting coronary artery disease. Fatty fish such as salmon and mackerel contain these healthy fatty acids. So do ground flaxseeds and flaxseed oil, soybeans, walnuts, and seeds. Why cut down on unhealthy fats? Eating foods that contain saturated fats can raise the LDL (\"bad\") cholesterol in your blood. Having a high level of LDL cholesterol increases your chance of hardening of the arteries (atherosclerosis), which can lead to heart disease, heart attack, and stroke. Trans fat raises the level of \"bad\" LDL cholesterol in your blood and may lower the \"good\" HDL cholesterol in your blood. Trans fat can raise your risk of heart disease, heart attack, and stroke. In general: · No more than 10% of your daily calories should come from saturated fat. This is about 20 grams in a 2,000-calorie diet. · No more than 10% of your daily calories should come from polyunsaturated fat. This is about 20 grams in a 2,000-calorie diet. · Monounsaturated fats can be up to 15% of your daily calories. This is about 25 to 30 grams in a 2,000-calorie diet. If you're not sure how much fat you should be eating or how many calories you need each day to stay at a healthy weight, talk to a registered dietitian. He or she can help you create a plan that's right for you. What can you do to cut down on fat? Foods like cheese, butter, sausage, and desserts can have a lot of unhealthy fats. Try these tips for healthier meals at home and when you eat out. At home · Fill up on fruits, vegetables, and whole grains. · Think of meat as a side dish instead of as the main part of your meal. 
· When you do eat meat, make it extra-lean ground beef (97% lean), ground turkey breast (without skin added), meats with fat trimmed off before cooking, or skinless chicken. · Try main dishes that use whole wheat pasta, brown rice, dried beans, or vegetables. · Use cooking methods that use little or no fat, such as broiling, steaming, or grilling. Use cooking spray instead of oil. If you use oil, use a monounsaturated oil, such as canola or olive oil. · Read food labels on canned, bottled, or packaged foods. Choose those with little saturated fat and no trans fat. When eating out at a restaurant · Order foods that are broiled or poached instead of fried or breaded. · Cut back on the amount of butter or margarine that you use on bread. Use small amounts of olive oil instead. · Order sauces, gravies, and salad dressings on the side, and use only a little. · When you order pasta, choose tomato sauce instead of cream sauce. · Ask for salsa with your baked potato instead of sour cream, butter, cheese, or art. Where can you learn more? Go to http://sachin-sae.info/. Enter J240 in the search box to learn more about \"Learning About Low-Fat Eating. \" Current as of: May 12, 2017 Content Version: 11.7 © 9360-9625 [employer]. Care instructions adapted under license by [employer] (which disclaims liability or warranty for this information). If you have questions about a medical condition or this instruction, always ask your healthcare professional. [address] any warranty or liability for your use of this information. Learning About Low-Carbohydrate Diets for Weight Loss What is a low-carbohydrate diet? Low-carb diets avoid foods that are high in carbohydrate. These high-carb foods include pasta, bread, rice, cereal, fruits, and starchy vegetables. Instead, these diets usually have you eat foods that are high in fat and protein. Many people lose weight quickly on a low-carb diet. But the early weight loss is water. People on this diet often gain the weight back after they start eating carbs again. Not all diet plans are safe or work well. A lot of the evidence shows that low-carb diets aren't healthy. That's because these diets often don't include healthy foods like fruits and vegetables. Losing weight safely means balancing protein, fat, and carbs with every meal and snack. And low-carb diets don't always provide the vitamins, minerals, and fiber you need. If you have a serious medical condition, talk to your doctor before you try any diet. These conditions include kidney disease, heart disease, type 2 diabetes, high cholesterol, and high blood pressure. If you are pregnant, it may not be safe for your baby if you are on a low-carb diet. How can you lose weight safely? You might have heard that a diet plan helped another person lose weight. But that doesn't mean that it will work for you. It is very hard to stay on a diet that includes lots of big changes in your eating habits. If you want to get to a healthy weight and stay there, making healthy lifestyle changes will often work better than dieting. These steps can help. · Make a plan for change. Work with your doctor to create a plan that is right for you. · See a dietitian. He or she can show you how to make healthy changes in your eating habits. · Manage stress. If you have a lot of stress in your life, it can be hard to focus on making healthy changes to your daily habits. · Track your food and activity. You are likely to do better at losing weight if you keep track of what you eat and what you do. Follow-up care is a key part of your treatment and safety. Be sure to make and go to all appointments, and call your doctor if you are having problems. It's also a good idea to know your test results and keep a list of the medicines you take. Where can you learn more? Go to http://sachin-sae.info/. Enter A121 in the search box to learn more about \"Learning About Low-Carbohydrate Diets for Weight Loss. \" Current as of: May 12, 2017 Content Version: 11.7 © 7616-4921 [employer]. Care instructions adapted under license by [employer] (which disclaims liability or warranty for this information). If you have questions about a medical condition or this instruction, always ask your healthcare professional. [place] [address] any warranty or liability for your use of this information. Migraine Headache: Care Instructions Your Care Instructions Migraines are painful, throbbing headaches that often start on one side of the head. They may cause nausea and vomiting and make you sensitive to light, sound, or smell. Without treatment, migraines can last from 4 hours to a few days. Medicines can help prevent migraines or stop them after they have started. Your doctor can help you find which ones work best for you. Follow-up care is a key part of your treatment and safety. Be sure to make and go to all appointments, and call your doctor if you are having problems. It's also a good idea to know your test results and keep a list of the medicines you take. How can you care for yourself at home? · Do not drive if you have taken a prescription pain medicine. · Rest in a quiet, dark room until your headache is gone. Close your eyes, and try to relax or go to sleep. Don't watch TV or read. · Put a cold, moist cloth or cold pack on the painful area for 10 to 20 minutes at a time. Put a thin cloth between the cold pack and your skin. · Use a warm, moist towel or a heating pad set on low to relax tight shoulder and neck muscles. · Have someone gently massage your neck and shoulders. · Take your medicines exactly as prescribed. Call your doctor if you think you are having a problem with your medicine. You will get more details on the specific medicines your doctor prescribes. · Be careful not to take pain medicine more often than the instructions allow. You could get worse or more frequent headaches when the medicine wears off. To prevent migraines · Keep a headache diary so you can figure out what triggers your headaches. Avoiding triggers may help you prevent headaches. Record when each headache began, how long it lasted, and what the pain was like. (Was it throbbing, aching, stabbing, or dull?) Write down any other symptoms you had with the headache, such as nausea, flashing lights or dark spots, or sensitivity to bright light or loud noise. Note if the headache occurred near your period.  List anything that might have triggered the headache. Triggers may include certain foods (chocolate, cheese, wine) or odors, smoke, bright light, stress, or lack of sleep. · If your doctor has prescribed medicine for your migraines, take it as directed. You may have medicine that you take only when you get a migraine and medicine that you take all the time to help prevent migraines. ¨ If your doctor has prescribed medicine for when you get a headache, take it at the first sign of a migraine, unless your doctor has given you other instructions. ¨ If your doctor has prescribed medicine to prevent migraines, take it exactly as prescribed. Call your doctor if you think you are having a problem with your medicine. · Find healthy ways to deal with stress. Migraines are most common during or right after stressful times. Take time to relax before and after you do something that has caused a migraine in the past. 
· Try to keep your muscles relaxed by keeping good posture. Check your jaw, face, neck, and shoulder muscles for tension. Try to relax them. When you sit at a desk, change positions often. And make sure to stretch for 30 seconds each hour. · Get plenty of sleep and exercise. · Eat meals on a regular schedule. Avoid foods and drinks that often trigger migraines. These include chocolate, alcohol (especially red wine and port), aspartame, monosodium glutamate (MSG), and some additives found in foods (such as hot dogs, atr, cold cuts, aged cheeses, and pickled foods). · Limit caffeine. Don't drink too much coffee, tea, or soda. But don't quit caffeine suddenly. That can also give you migraines. · Do not smoke or allow others to smoke around you. If you need help quitting, talk to your doctor about stop-smoking programs and medicines. These can increase your chances of quitting for good. · If you are taking birth control pills or hormone therapy, talk to your doctor about whether they are triggering your migraines. When should you call for help? Call 911 anytime you think you may need emergency care. For example, call if: 
  · You have signs of a stroke. These may include: 
¨ Sudden numbness, paralysis, or weakness in your face, arm, or leg, especially on only one side of your body. ¨ Sudden vision changes. ¨ Sudden trouble speaking. ¨ Sudden confusion or trouble understanding simple statements. ¨ Sudden problems with walking or balance. ¨ A sudden, severe headache that is different from past headaches.  
 Call your doctor now or seek immediate medical care if: 
  · You have new or worse nausea and vomiting.  
  · You have a new or higher fever.  
  · Your headache gets much worse.  
 Watch closely for changes in your health, and be sure to contact your doctor if: 
  · You are not getting better after 2 days (48 hours). Where can you learn more? Go to http://sachin-sae.info/. Enter V507 in the search box to learn more about \"Migraine Headache: Care Instructions. \" Current as of: October 9, 2017 Content Version: 11.7 © 5206-3545 [employer]wise[employer]. Care instructions adapted under license by [employer] (which disclaims liability or warranty for this information). If you have questions about a medical condition or this instruction, always ask your healthcare professional. [place] [address] any warranty or liability for your use of this information. Introducing [facility]! [employer] introduces [employer] patient portal. Now you can access parts of your medical record, email your doctor's office, and request medication refills online. 1. In your internet browser, go to [email or web address] 2. Click on the First Time User? Click Here link in the Sign In box. You will see the New Member Sign Up page. 3. Enter your [employer] Access Code exactly as it appears below. You will not need to use this code after youve completed the sign-up process.  If you do not sign up before the expiration date, you must request a new code. · [employer] Access Code: [email or web address] Expires: 11/27/2018  9:04 AM 
 
4. Enter the last four digits of your Social Security Number (xxxx) and Date of Birth (mm/dd/yyyy) as indicated and click Submit. You will be taken to the next sign-up page. 5. Create a [employer] ID. This will be your [employer] login ID and cannot be changed, so think of one that is secure and easy to remember. 6. Create a [employer] password. You can change your password at any time. 7. Enter your Password Reset Question and Answer. This can be used at a later time if you forget your password. 8. Enter your e-mail address. You will receive e-mail notification when new information is available in [address] 9. Click Sign Up. You can now view and download portions of your medical record. 10. Click the Download Summary menu link to download a portable copy of your medical information. If you have questions, please visit the Frequently Asked Questions section of the [employer] website. Remember, [employer] is NOT to be used for urgent needs. For medical emergencies, dial 911. Now available from your iPhone and Android! Please provide this summary of care documentation to your next provider. Your primary care clinician is listed as Roxie Ruiz. If you have any questions after today's visit, please call [phone].

## 2018-09-19 ENCOUNTER — TELEPHONE (OUTPATIENT)
Dept: FAMILY MEDICINE CLINIC | Age: 38
End: 2018-09-19

## 2018-09-19 NOTE — TELEPHONE ENCOUNTER
Pt called f/u on a script that suppose to be written for her cholesterol Please call pt at your earliest convenience.

## 2018-09-21 RX ORDER — ATORVASTATIN CALCIUM 20 MG/1
20 TABLET, FILM COATED ORAL DAILY
Qty: 30 TAB | Refills: 1 | Status: SHIPPED | OUTPATIENT
Start: 2018-09-21 | End: 2019-05-08 | Stop reason: SDUPTHER

## 2018-10-30 DIAGNOSIS — R79.89 ELEVATED TSH: ICD-10-CM

## 2018-10-30 RX ORDER — LEVOTHYROXINE SODIUM 25 UG/1
TABLET ORAL
Qty: 30 TAB | Refills: 1 | Status: SHIPPED | OUTPATIENT
Start: 2018-10-30 | End: 2019-01-28 | Stop reason: SDUPTHER

## 2019-01-28 ENCOUNTER — HOSPITAL ENCOUNTER (OUTPATIENT)
Dept: LAB | Age: 39
Discharge: HOME | End: 2019-01-28

## 2019-01-28 DIAGNOSIS — R79.89 ELEVATED TSH: ICD-10-CM

## 2019-01-28 LAB — XX-LABCORP SPECIMEN COL,LCBCF: NORMAL

## 2019-01-28 PROCEDURE — 99001 SPECIMEN HANDLING PT-LAB: CPT

## 2019-01-28 RX ORDER — LEVOTHYROXINE SODIUM 25 UG/1
25 TABLET ORAL
Qty: 30 TAB | Refills: 2 | Status: SHIPPED | OUTPATIENT
Start: 2019-01-28 | End: 2019-03-19

## 2019-01-28 NOTE — TELEPHONE ENCOUNTER
This patient contacted office for the following prescriptions to be filled:    Medication requested :   Requested Prescriptions     Pending Prescriptions Disp Refills    levothyroxine (SYNTHROID) 25 mcg tablet 30 Tab 1     PCP:  [address] or Print: Rhys   Mail order or Local pharmacy [address]     Scheduled appointment if not seen by current providers in office: LOV 9/17/2018 f/u 2/1/2019

## 2019-01-29 LAB
25(OH)D3+25(OH)D2 SERPL-MCNC: 14.7 NG/ML (ref 30–100)
ALBUMIN SERPL-MCNC: 4.6 G/DL (ref 3.5–5.5)
ALBUMIN/GLOB SERPL: 1.8 {RATIO} (ref 1.2–2.2)
ALP SERPL-CCNC: 86 IU/L (ref 39–117)
ALT SERPL-CCNC: 25 IU/L (ref 0–32)
AST SERPL-CCNC: 16 IU/L (ref 0–40)
BILIRUB SERPL-MCNC: 0.3 MG/DL (ref 0–1.2)
BUN SERPL-MCNC: 10 MG/DL (ref 6–20)
BUN/CREAT SERPL: 16 (ref 9–23)
CALCIUM SERPL-MCNC: 9.9 MG/DL (ref 8.7–10.2)
CHLORIDE SERPL-SCNC: 103 MMOL/L (ref 96–106)
CHOLEST SERPL-MCNC: 259 MG/DL (ref 100–199)
CO2 SERPL-SCNC: 24 MMOL/L (ref 20–29)
COMMENT, 011824: ABNORMAL
CREAT SERPL-MCNC: 0.64 MG/DL (ref 0.57–1)
GLOBULIN SER CALC-MCNC: 2.6 G/DL (ref 1.5–4.5)
GLUCOSE SERPL-MCNC: 96 MG/DL (ref 65–99)
HDLC SERPL-MCNC: 32 MG/DL
INTERPRETATION, 910389: NORMAL
LDLC SERPL CALC-MCNC: 200 MG/DL (ref 0–99)
POTASSIUM SERPL-SCNC: 5 MMOL/L (ref 3.5–5.2)
PROT SERPL-MCNC: 7.2 G/DL (ref 6–8.5)
SODIUM SERPL-SCNC: 141 MMOL/L (ref 134–144)
TRIGL SERPL-MCNC: 134 MG/DL (ref 0–149)
TSH SERPL DL<=0.005 MIU/L-ACNC: 4.72 UIU/ML (ref 0.45–4.5)
VLDLC SERPL CALC-MCNC: 27 MG/DL (ref 5–40)

## 2019-01-29 NOTE — PROGRESS NOTES
Contacted patient and verified identity using name and date of birth (2- identifiers)  Spoke with patient and she verbalized understanding of need to keep 2/1/19 appointment for lab review. Patient did indicate she never picked up the Cholesterol medication as the pharmacy did not call her.

## 2019-02-01 ENCOUNTER — OFFICE VISIT (OUTPATIENT)
Dept: FAMILY MEDICINE CLINIC | Age: 39
End: 2019-02-01

## 2019-02-01 VITALS
HEIGHT: 62 IN | BODY MASS INDEX: 35.7 KG/M2 | SYSTOLIC BLOOD PRESSURE: 118 MMHG | OXYGEN SATURATION: 95 % | RESPIRATION RATE: 14 BRPM | WEIGHT: 194 LBS | HEART RATE: 67 BPM | TEMPERATURE: 98 F | DIASTOLIC BLOOD PRESSURE: 74 MMHG

## 2019-02-01 DIAGNOSIS — E78.00 ELEVATED LDL CHOLESTEROL LEVEL: Primary | ICD-10-CM

## 2019-02-01 DIAGNOSIS — R79.89 ELEVATED TSH: ICD-10-CM

## 2019-02-01 DIAGNOSIS — E55.9 VITAMIN D DEFICIENCY: ICD-10-CM

## 2019-02-01 DIAGNOSIS — B00.1 RECURRENT COLD SORES: ICD-10-CM

## 2019-02-01 DIAGNOSIS — G43.009 MIGRAINE WITHOUT AURA AND WITHOUT STATUS MIGRAINOSUS, NOT INTRACTABLE: ICD-10-CM

## 2019-02-01 DIAGNOSIS — R73.03 PREDIABETES: ICD-10-CM

## 2019-02-01 RX ORDER — ERGOCALCIFEROL 1.25 MG/1
50000 CAPSULE ORAL
Qty: 12 CAP | Refills: 0 | Status: SHIPPED | OUTPATIENT
Start: 2019-02-01 | End: 2019-09-30 | Stop reason: ALTCHOICE

## 2019-02-01 RX ORDER — PROPRANOLOL HYDROCHLORIDE 10 MG/1
10 TABLET ORAL 2 TIMES DAILY
Qty: 30 TAB | Refills: 1 | Status: SHIPPED | OUTPATIENT
Start: 2019-02-01 | End: 2019-05-08 | Stop reason: SDDI

## 2019-02-01 RX ORDER — VALACYCLOVIR HYDROCHLORIDE 1 G/1
TABLET, FILM COATED ORAL
Qty: 20 TAB | Refills: 0 | Status: SHIPPED | OUTPATIENT
Start: 2019-02-01 | End: 2019-09-30 | Stop reason: SDUPTHER

## 2019-02-01 NOTE — PATIENT INSTRUCTIONS
Learning About Low-Fat Eating  What is low-fat eating? Most food has some fat in it. Your body needs some fat to be healthy. But some kinds of fats are healthier than others. In a low-fat eating plan, you try to choose healthier fats and eat fewer unhealthy fats. Healthy fats include olive and canola oil. Try to avoid eating too much saturated fat (such as in cheese and meats) and trans fat (a type of fat found in many packaged snack foods and other baked goods). You do not need to cut all fat from your diet. But you can make healthier choices about the types and amount of fat you eat. Even though it is a good idea to choose healthier fats, it is still important to be careful of how much fat you eat, because all fats are high in calories. What are the different types of fats? Unhealthy fats  · Saturated fat. Saturated fats are mostly in animal foods, such as meat and dairy foods. Tropical oils, such as coconut oil, palm oil, and cocoa butter, are also saturated fats. · Trans fat. Trans fats include shortening, partially hydrogenated vegetable oils, and hydrogenated vegetable oils. Trans fats are made when a liquid fat is turned into a solid fat (for example, when corn oil is made into stick margarine). They are in many processed foods, such as cookies, crackers, and snack foods. Healthy fats  · Monounsaturated fat. Monounsaturated fats are liquid at room temperature but get solid when refrigerated. Eating foods that are high in this fat may help lower your \"bad\" (LDL) cholesterol, keep your \"good\" (HDL) cholesterol level up, and lower your chances of getting coronary artery disease. This fat is found in canola oil, olive oil, peanut oil, olives, avocados, nuts, and nut butters. · Polyunsaturated fat. Polyunsaturated fats are liquid at room temperature. They are in safflower, sunflower, and corn oils. They are also the main fat in seafood. Omega-3 fatty acids are types of polyunsaturated fat.  Eating fish may lower your chances of getting coronary artery disease. Fatty fish such as salmon and mackerel contain these healthy fatty acids. So do ground flaxseeds and flaxseed oil, soybeans, walnuts, and seeds. Why cut down on unhealthy fats? Eating foods that contain saturated fats can raise the LDL (\"bad\") cholesterol in your blood. Having a high level of LDL cholesterol increases your chance of hardening of the arteries (atherosclerosis), which can lead to heart disease, heart attack, and stroke. Trans fat raises the level of \"bad\" LDL cholesterol in your blood and may lower the \"good\" HDL cholesterol in your blood. Trans fat can raise your risk of heart disease, heart attack, and stroke. In general:  · No more than 10% of your daily calories should come from saturated fat. This is about 20 grams in a 2,000-calorie diet. · No more than 10% of your daily calories should come from polyunsaturated fat. This is about 20 grams in a 2,000-calorie diet. · Monounsaturated fats can be up to 15% of your daily calories. This is about 25 to 30 grams in a 2,000-calorie diet. If you're not sure how much fat you should be eating or how many calories you need each day to stay at a healthy weight, talk to a registered dietitian. He or she can help you create a plan that's right for you. What can you do to cut down on fat? Foods like cheese, butter, sausage, and desserts can have a lot of unhealthy fats. Try these tips for healthier meals at home and when you eat out. At home  · Fill up on fruits, vegetables, and whole grains. · Think of meat as a side dish instead of as the main part of your meal.  · When you do eat meat, make it extra-lean ground beef (97% lean), ground turkey breast (without skin added), meats with fat trimmed off before cooking, or skinless chicken. · Try main dishes that use whole wheat pasta, brown rice, dried beans, or vegetables.   · Use cooking methods that use little or no fat, such as broiling, steaming, or grilling. Use cooking spray instead of oil. If you use oil, use a monounsaturated oil, such as canola or olive oil. · Read food labels on canned, bottled, or packaged foods. Choose those with little saturated fat and no trans fat. When eating out at a restaurant  · Order foods that are broiled or poached instead of fried or breaded. · Cut back on the amount of butter or margarine that you use on bread. Use small amounts of olive oil instead. · Order sauces, gravies, and salad dressings on the side, and use only a little. · When you order pasta, choose tomato sauce instead of cream sauce. · Ask for salsa with your baked potato instead of sour cream, butter, cheese, or art. Where can you learn more? Go to http://sachin-sae.info/. Enter D573 in the search box to learn more about \"Learning About Low-Fat Eating. \"  Current as of: March 28, 2018  Content Version: 11.9  © 2247-5964 [employer]. Care instructions adapted under license by [employer] (which disclaims liability or warranty for this information). If you have questions about a medical condition or this instruction, always ask your healthcare professional. [address] any warranty or liability for your use of this information. Hypothyroidism: Care Instructions  Your Care Instructions    You have hypothyroidism, which means that your body is not making enough thyroid hormone. This hormone helps your body use energy. If your thyroid level is low, you may feel tired, be constipated, have an increase in your blood pressure, or have dry skin or memory problems. You may also get cold easily, even when it is warm. Women with low thyroid levels may have heavy menstrual periods. A blood test to find your thyroid-stimulating hormone (TSH) level is used to check for hypothyroidism. A high TSH level may mean that you have low thyroid.  When your body is not making enough thyroid hormone, TSH levels rise in an effort to make the body produce more. The treatment for hypothyroidism is to take thyroid hormone pills. You should start to feel better in 1 to 2 weeks. But it can take several months to see changes in the TSH level. You will need regular visits with your doctor to make sure you have the right dose of medicine. Most people need treatment for the rest of their lives. You will need to see your doctor regularly to have blood tests and to make sure you are doing well. Follow-up care is a key part of your treatment and safety. Be sure to make and go to all appointments, and call your doctor if you are having problems. It's also a good idea to know your test results and keep a list of the medicines you take. How can you care for yourself at home? · Take your thyroid hormone medicine exactly as prescribed. Call your doctor if you think you are having a problem with your medicine. Most people do not have side effects if they take the right amount of medicine regularly. ? Take the medicine 30 minutes before breakfast, and do not take it with calcium, vitamins, or iron. ? Do not take extra doses of your thyroid medicine. It will not help you get better any faster, and it may cause side effects. ? If you forget to take a dose, do NOT take a double dose of medicine. Take your usual dose the next day. · Tell your doctor about all prescription, herbal, or over-the-counter products you take. · Take care of yourself. Eat a healthy diet, get enough sleep, and get regular exercise. When should you call for help? Call 911 anytime you think you may need emergency care.  For example, call if:    · You passed out (lost consciousness).     · You have severe trouble breathing.     · You have a very slow heartbeat (less than 60 beats a minute).     · You have a low body temperature (95°F or below).    Call your doctor now or seek immediate medical care if:    · You feel tired, sluggish, or weak.     · You have trouble remembering things or concentrating.     · You do not begin to feel better 2 weeks after starting your medicine.    Watch closely for changes in your health, and be sure to contact your doctor if you have any problems. Where can you learn more? Go to http://sachin-sae.info/. Enter V648 in the search box to learn more about \"Hypothyroidism: Care Instructions. \"  Current as of: March 14, 2018  Content Version: 11.9  © 7521-6083 [employer]. Care instructions adapted under license by [facility] (which disclaims liability or warranty for this information). If you have questions about a medical condition or this instruction, always ask your healthcare professional. [address] any warranty or liability for your use of this information. Migraine Headache: Care Instructions  Your Care Instructions  Migraines are painful, throbbing headaches that often start on one side of the head. They may cause nausea and vomiting and make you sensitive to light, sound, or smell. Without treatment, migraines can last from 4 hours to a few days. Medicines can help prevent migraines or stop them after they have started. Your doctor can help you find which ones work best for you. Follow-up care is a key part of your treatment and safety. Be sure to make and go to all appointments, and call your doctor if you are having problems. It's also a good idea to know your test results and keep a list of the medicines you take. How can you care for yourself at home? · Do not drive if you have taken a prescription pain medicine. · Rest in a quiet, dark room until your headache is gone. Close your eyes, and try to relax or go to sleep. Don't watch TV or read. · Put a cold, moist cloth or cold pack on the painful area for 10 to 20 minutes at a time. Put a thin cloth between the cold pack and your skin.   · Use a warm, moist towel or a heating pad set on low to relax tight shoulder and neck muscles. · Have someone gently massage your neck and shoulders. · Take your medicines exactly as prescribed. Call your doctor if you think you are having a problem with your medicine. You will get more details on the specific medicines your doctor prescribes. · Be careful not to take pain medicine more often than the instructions allow. You could get worse or more frequent headaches when the medicine wears off. To prevent migraines  · Keep a headache diary so you can figure out what triggers your headaches. Avoiding triggers may help you prevent headaches. Record when each headache began, how long it lasted, and what the pain was like. (Was it throbbing, aching, stabbing, or dull?) Write down any other symptoms you had with the headache, such as nausea, flashing lights or dark spots, or sensitivity to bright light or loud noise. Note if the headache occurred near your period. List anything that might have triggered the headache. Triggers may include certain foods (chocolate, cheese, wine) or odors, smoke, bright light, stress, or lack of sleep. · If your doctor has prescribed medicine for your migraines, take it as directed. You may have medicine that you take only when you get a migraine and medicine that you take all the time to help prevent migraines. ? If your doctor has prescribed medicine for when you get a headache, take it at the first sign of a migraine, unless your doctor has given you other instructions. ? If your doctor has prescribed medicine to prevent migraines, take it exactly as prescribed. Call your doctor if you think you are having a problem with your medicine. · Find healthy ways to deal with stress. Migraines are most common during or right after stressful times. Take time to relax before and after you do something that has caused a migraine in the past.  · Try to keep your muscles relaxed by keeping good posture.  Check your jaw, face, neck, and shoulder muscles for tension. Try to relax them. When you sit at a desk, change positions often. And make sure to stretch for 30 seconds each hour. · Get plenty of sleep and exercise. · Eat meals on a regular schedule. Avoid foods and drinks that often trigger migraines. These include chocolate, alcohol (especially red wine and port), aspartame, monosodium glutamate (MSG), and some additives found in foods (such as hot dogs, art, cold cuts, aged cheeses, and pickled foods). · Limit caffeine. Don't drink too much coffee, tea, or soda. But don't quit caffeine suddenly. That can also give you migraines. · Do not smoke or allow others to smoke around you. If you need help quitting, talk to your doctor about stop-smoking programs and medicines. These can increase your chances of quitting for good. · If you are taking birth control pills or hormone therapy, talk to your doctor about whether they are triggering your migraines. When should you call for help? Call 911 anytime you think you may need emergency care. For example, call if:    · You have signs of a stroke. These may include:  ? Sudden numbness, paralysis, or weakness in your face, arm, or leg, especially on only one side of your body. ? Sudden vision changes. ? Sudden trouble speaking. ? Sudden confusion or trouble understanding simple statements. ? Sudden problems with walking or balance. ? A sudden, severe headache that is different from past headaches.    Call your doctor now or seek immediate medical care if:    · You have new or worse nausea and vomiting.     · You have a new or higher fever.     · Your headache gets much worse.    Watch closely for changes in your health, and be sure to contact your doctor if:    · You are not getting better after 2 days (48 hours). Where can you learn more? Go to http://sachin-sae.info/.   Enter M828 in the search box to learn more about \"Migraine Headache: Care Instructions. \"  Current as of: Heather 3, 2018  Content Version: 11.9  © 6760-2104 [employer], [facility]. Care instructions adapted under license by [employer] (which disclaims liability or warranty for this information). If you have questions about a medical condition or this instruction, always ask your healthcare professional. [place] [address] any warranty or liability for your use of this information.

## 2019-02-01 NOTE — PROGRESS NOTES
HISTORY OF PRESENT ILLNESS  Dilia Luther is a 45 y.o. female. HPI: here for follow up and to discuss lab result. Lab Results   Component Value Date/Time    WBC 7.3 08/29/2018 12:00 AM    HGB 14.3 08/29/2018 12:00 AM    HCT 43.4 08/29/2018 12:00 AM    PLATELET 294 66/99/3184 12:00 AM    MCV 89 08/29/2018 12:00 AM     Lab Results   Component Value Date/Time    Sodium 141 01/28/2019 12:00 AM    Potassium 5.0 01/28/2019 12:00 AM    Chloride 103 01/28/2019 12:00 AM    CO2 24 01/28/2019 12:00 AM    Anion gap 15.0 10/15/2015 10:00 AM    Glucose 96 01/28/2019 12:00 AM    BUN 10 01/28/2019 12:00 AM    Creatinine 0.64 01/28/2019 12:00 AM    BUN/Creatinine ratio 16 01/28/2019 12:00 AM    GFR est  01/28/2019 12:00 AM    GFR est non- 01/28/2019 12:00 AM    Calcium 9.9 01/28/2019 12:00 AM    Bilirubin, total 0.3 01/28/2019 12:00 AM    AST (SGOT) 16 01/28/2019 12:00 AM    Alk. phosphatase 86 01/28/2019 12:00 AM    Protein, total 7.2 01/28/2019 12:00 AM    Albumin 4.6 01/28/2019 12:00 AM    Globulin 2.5 10/15/2015 10:00 AM    A-G Ratio 1.8 01/28/2019 12:00 AM    ALT (SGPT) 25 01/28/2019 12:00 AM     Lab Results   Component Value Date/Time    Cholesterol, total 259 (H) 01/28/2019 12:00 AM    HDL Cholesterol 32 (L) 01/28/2019 12:00 AM    LDL, calculated 200 (H) 01/28/2019 12:00 AM    VLDL, calculated 27 01/28/2019 12:00 AM    Triglyceride 134 01/28/2019 12:00 AM     Lab Results   Component Value Date/Time    TSH 4.720 (H) 01/28/2019 12:00 AM     Lab Results   Component Value Date/Time    Hemoglobin A1c 5.9 (H) 08/29/2018 12:00 AM     Lab Results   Component Value Date/Time    VITAMIN D, 25-HYDROXY 14.7 (L) 01/28/2019 12:00 AM       Currently not taken any statin as she did not it was ready. She is working on life style modification. Does exercise almost 45 minutes to an hour. Also working on diet modification. Elevated LDL. Discussed risk factor for heart disease.  Agree to see nutritionist and also starting statin. Low vitamin D level. Feeling some fatigue. Now not on any supplement. Agree to start. Also prediabetes. Working on life style modification. Low tsh. For now some fatigue but no significant weight or appetite changes. No hair or skin changes. No bowel movement changes. No palpitation. No diaphoresis. No heat or cold intolerance. Denies any dizziness, no chest pain or trouble breathing, no arm or leg weakness. No nausea or vomiting, no weight or appetite changes, no depression or anxiety. No urine or bowel complains, no palpitation, no diaphoresis. Has h/o migraine headaches. Not able to tolerate topamax. Agree to try another medication. When it comes last for hours. otc medication not much help. Light and sound bothers her, associated with nausea . No vision changes. No ext weakness./  No radiation of pain. Bilateral and also comes 3-4 times a week. Visit Vitals  /74 (BP 1 Location: Left arm, BP Patient Position: Sitting)   Pulse 67   Temp 98 °F (36.7 °C) (Oral)   Resp 14   Ht 5' 2\" (1.575 m)   Wt 194 lb (88 kg)   SpO2 95%   BMI 35.48 kg/m²     Review medication list, vitals, problem list,allergies. ROS: see HPI     Physical Exam   Constitutional: She is oriented to person, place, and time. No distress. Neck: No thyromegaly present. Cardiovascular: Normal rate, regular rhythm and normal heart sounds. Pulmonary/Chest:   CTA   Abdominal: Soft. Bowel sounds are normal. There is no tenderness. Musculoskeletal: She exhibits no edema. Lymphadenopathy:     She has no cervical adenopathy. Neurological: She is oriented to person, place, and time. Psychiatric: Her behavior is normal.       ASSESSMENT and PLAN    ICD-10-CM ICD-9-CM    1. Elevated LDL cholesterol level: starting statin. Discussed medication side effects. Advised to take it at bedtime. Also diet modification. Working on weight loss as well. Sending to nutritionists. E78.00 272.0 REFERRAL TO NUTRITION   2.  Recurrent cold sores B00.1 054.9 valACYclovir (VALTREX) 1 gram tablet   3. Elevated TSH: repeat test in 6 wks. Some fatigue which could be multifactorial.  R79.89 794.5 TSH 3RD GENERATION   4. Vitamin D deficiency: starting on supplement. E55.9 268.9 REFERRAL TO NUTRITION      ergocalciferol (DRISDOL) 50,000 unit capsule   5. Prediabetes: life style modification. Will observe.  R73.03 790.29    6. BMI 35.0-35.9,adult: working on diet modification and exercise. Z68.35 V85.35    7. Migraine without aura and without status migrainosus, not intractable: not able to tolerate topamax. For now giving inderal. Given medication side effects. Also home blood pressure and HR monitoring  G43.009 346.10 propranolol (INDERAL) 10 mg tablet   Pt understood and agree with the plan   Review    Follow-up Disposition:  Return in about 1 month (around 3/1/2019).

## 2019-02-01 NOTE — PROGRESS NOTES
1. Have you been to the ER, urgent care clinic since your last visit? Hospitalized since your last visit? No    2. Have you seen or consulted any other health care providers outside of the [address] [place] [address] since your last visit? Include any pap smears or colon screening.  No

## 2019-02-04 ENCOUNTER — TELEPHONE (OUTPATIENT)
Dept: FAMILY MEDICINE CLINIC | Age: 39
End: 2019-02-04

## 2019-02-04 NOTE — TELEPHONE ENCOUNTER
[facility] need calcification on med   Requested Prescriptions     Pending Prescriptions Disp Refills    atorvastatin (LIPITOR) 20 mg tablet 30 Tab 1     Sig: Take 1 Tab by mouth daily.      Please call [facility] [phone]

## 2019-03-18 ENCOUNTER — HOSPITAL ENCOUNTER (OUTPATIENT)
Dept: LAB | Age: 39
Discharge: HOME | End: 2019-03-18

## 2019-03-18 LAB — XX-LABCORP SPECIMEN COL,LCBCF: NORMAL

## 2019-03-18 PROCEDURE — 99001 SPECIMEN HANDLING PT-LAB: CPT

## 2019-03-19 DIAGNOSIS — R79.89 ELEVATED TSH: ICD-10-CM

## 2019-03-19 LAB — TSH SERPL DL<=0.005 MIU/L-ACNC: 8.73 UIU/ML (ref 0.45–4.5)

## 2019-03-19 RX ORDER — LEVOTHYROXINE SODIUM 50 UG/1
50 TABLET ORAL
Qty: 30 TAB | Refills: 1 | Status: SHIPPED | OUTPATIENT
Start: 2019-03-19 | End: 2019-05-08 | Stop reason: SDUPTHER

## 2019-03-21 NOTE — PROGRESS NOTES
Contacted patient and verified identity using name and date of birth (2- identifiers)  Spoke with patient and she verbalized understanding of increase in thyroid and need for adjusted dose. Repeat labs in 6 weeks.

## 2019-05-06 ENCOUNTER — HOSPITAL ENCOUNTER (OUTPATIENT)
Dept: LAB | Age: 39
Discharge: HOME | End: 2019-05-06

## 2019-05-06 LAB — XX-LABCORP SPECIMEN COL,LCBCF: NORMAL

## 2019-05-06 PROCEDURE — 99001 SPECIMEN HANDLING PT-LAB: CPT

## 2019-05-07 LAB — TSH SERPL DL<=0.005 MIU/L-ACNC: 2.87 UIU/ML (ref 0.45–4.5)

## 2019-05-07 NOTE — PROGRESS NOTES
Contacted patient and verified identity using name and date of birth (2- identifiers)  Spoke with patient and she verbalized understanding of improvement in TSH and to continue current dose. Has FU scheduled for tomorrow.

## 2019-05-08 ENCOUNTER — OFFICE VISIT (OUTPATIENT)
Dept: FAMILY MEDICINE CLINIC | Age: 39
End: 2019-05-08

## 2019-05-08 VITALS
HEIGHT: 62 IN | TEMPERATURE: 98.2 F | RESPIRATION RATE: 16 BRPM | WEIGHT: 194.8 LBS | HEART RATE: 76 BPM | DIASTOLIC BLOOD PRESSURE: 72 MMHG | OXYGEN SATURATION: 98 % | SYSTOLIC BLOOD PRESSURE: 112 MMHG | BODY MASS INDEX: 35.85 KG/M2

## 2019-05-08 DIAGNOSIS — R23.9 RECENT SKIN CHANGES: Primary | ICD-10-CM

## 2019-05-08 DIAGNOSIS — B00.1 RECURRENT COLD SORES: ICD-10-CM

## 2019-05-08 DIAGNOSIS — E55.9 VITAMIN D DEFICIENCY: ICD-10-CM

## 2019-05-08 DIAGNOSIS — E78.5 HYPERLIPIDEMIA, UNSPECIFIED HYPERLIPIDEMIA TYPE: ICD-10-CM

## 2019-05-08 DIAGNOSIS — G43.009 MIGRAINE WITHOUT AURA AND WITHOUT STATUS MIGRAINOSUS, NOT INTRACTABLE: ICD-10-CM

## 2019-05-08 DIAGNOSIS — R79.89 ELEVATED TSH: ICD-10-CM

## 2019-05-08 RX ORDER — ERGOCALCIFEROL 1.25 MG/1
50000 CAPSULE ORAL
Qty: 12 CAP | Refills: 0 | Status: CANCELLED | OUTPATIENT
Start: 2019-05-08

## 2019-05-08 RX ORDER — LEVOTHYROXINE SODIUM 50 UG/1
50 TABLET ORAL
Qty: 30 TAB | Refills: 1 | Status: SHIPPED | OUTPATIENT
Start: 2019-05-08 | End: 2019-09-25 | Stop reason: SDUPTHER

## 2019-05-08 RX ORDER — ATORVASTATIN CALCIUM 20 MG/1
20 TABLET, FILM COATED ORAL DAILY
Qty: 30 TAB | Refills: 1 | Status: SHIPPED | OUTPATIENT
Start: 2019-05-08 | End: 2019-09-30 | Stop reason: SDUPTHER

## 2019-05-08 NOTE — PATIENT INSTRUCTIONS
Vitamin d 2000 units D3 over the counter everyday. Fatigue: Care Instructions Your Care Instructions Fatigue is a feeling of tiredness, exhaustion, or lack of energy. You may feel fatigue because of too much or not enough activity. It can also come from stress, lack of sleep, boredom, and poor diet. Many medical problems, such as viral infections, can cause fatigue. Emotional problems, especially depression, are often the cause of fatigue. Fatigue is most often a symptom of another problem. Treatment for fatigue depends on the cause. For example, if you have fatigue because you have a certain health problem, treating this problem also treats your fatigue. If depression or anxiety is the cause, treatment may help. Follow-up care is a key part of your treatment and safety. Be sure to make and go to all appointments, and call your doctor if you are having problems. It's also a good idea to know your test results and keep a list of the medicines you take. How can you care for yourself at home? · Get regular exercise. But don't overdo it. Go back and forth between rest and exercise. · Get plenty of rest. 
· Eat a healthy diet. Do not skip meals, especially breakfast. 
· Reduce your use of caffeine, tobacco, and alcohol. Caffeine is most often found in coffee, tea, cola drinks, and chocolate. · Limit medicines that can cause fatigue. This includes tranquilizers and cold and allergy medicines. When should you call for help? Watch closely for changes in your health, and be sure to contact your doctor if: 
  · You have new symptoms such as fever or a rash.  
  · Your fatigue gets worse.  
  · You have been feeling down, depressed, or hopeless. Or you may have lost interest in things that you usually enjoy.  
  · You are not getting better as expected. Where can you learn more? Go to http://sachin-sae.info/.  
Enter A947 in the search box to learn more about \"Fatigue: Care Instructions. \" Current as of: September 23, 2018 Content Version: 11.9 © 2566-1201 [employer]. Care instructions adapted under license by [employer] (which disclaims liability or warranty for this information). If you have questions about a medical condition or this instruction, always ask your healthcare professional. [address] any warranty or liability for your use of this information. Migraine Headache: Care Instructions Your Care Instructions Migraines are painful, throbbing headaches that often start on one side of the head. They may cause nausea and vomiting and make you sensitive to light, sound, or smell. Without treatment, migraines can last from 4 hours to a few days. Medicines can help prevent migraines or stop them after they have started. Your doctor can help you find which ones work best for you. Follow-up care is a key part of your treatment and safety. Be sure to make and go to all appointments, and call your doctor if you are having problems. It's also a good idea to know your test results and keep a list of the medicines you take. How can you care for yourself at home? · Do not drive if you have taken a prescription pain medicine. · Rest in a quiet, dark room until your headache is gone. Close your eyes, and try to relax or go to sleep. Don't watch TV or read. · Put a cold, moist cloth or cold pack on the painful area for 10 to 20 minutes at a time. Put a thin cloth between the cold pack and your skin. · Use a warm, moist towel or a heating pad set on low to relax tight shoulder and neck muscles. · Have someone gently massage your neck and shoulders. · Take your medicines exactly as prescribed. Call your doctor if you think you are having a problem with your medicine. You will get more details on the specific medicines your doctor prescribes.  
· Be careful not to take pain medicine more often than the instructions allow. You could get worse or more frequent headaches when the medicine wears off. To prevent migraines · Keep a headache diary so you can figure out what triggers your headaches. Avoiding triggers may help you prevent headaches. Record when each headache began, how long it lasted, and what the pain was like. (Was it throbbing, aching, stabbing, or dull?) Write down any other symptoms you had with the headache, such as nausea, flashing lights or dark spots, or sensitivity to bright light or loud noise. Note if the headache occurred near your period. List anything that might have triggered the headache. Triggers may include certain foods (chocolate, cheese, wine) or odors, smoke, bright light, stress, or lack of sleep. · If your doctor has prescribed medicine for your migraines, take it as directed. You may have medicine that you take only when you get a migraine and medicine that you take all the time to help prevent migraines. ? If your doctor has prescribed medicine for when you get a headache, take it at the first sign of a migraine, unless your doctor has given you other instructions. ? If your doctor has prescribed medicine to prevent migraines, take it exactly as prescribed. Call your doctor if you think you are having a problem with your medicine. · Find healthy ways to deal with stress. Migraines are most common during or right after stressful times. Take time to relax before and after you do something that has caused a migraine in the past. 
· Try to keep your muscles relaxed by keeping good posture. Check your jaw, face, neck, and shoulder muscles for tension. Try to relax them. When you sit at a desk, change positions often. And make sure to stretch for 30 seconds each hour. · Get plenty of sleep and exercise. · Eat meals on a regular schedule. Avoid foods and drinks that often trigger migraines.  These include chocolate, alcohol (especially red wine and port), aspartame, monosodium glutamate (MSG), and some additives found in foods (such as hot dogs, art, cold cuts, aged cheeses, and pickled foods). · Limit caffeine. Don't drink too much coffee, tea, or soda. But don't quit caffeine suddenly. That can also give you migraines. · Do not smoke or allow others to smoke around you. If you need help quitting, talk to your doctor about stop-smoking programs and medicines. These can increase your chances of quitting for good. · If you are taking birth control pills or hormone therapy, talk to your doctor about whether they are triggering your migraines. When should you call for help? Call 911 anytime you think you may need emergency care. For example, call if: 
  · You have signs of a stroke. These may include: 
? Sudden numbness, paralysis, or weakness in your face, arm, or leg, especially on only one side of your body. ? Sudden vision changes. ? Sudden trouble speaking. ? Sudden confusion or trouble understanding simple statements. ? Sudden problems with walking or balance. ? A sudden, severe headache that is different from past headaches.  
 Call your doctor now or seek immediate medical care if: 
  · You have new or worse nausea and vomiting.  
  · You have a new or higher fever.  
  · Your headache gets much worse.  
 Watch closely for changes in your health, and be sure to contact your doctor if: 
  · You are not getting better after 2 days (48 hours). Where can you learn more? Go to http://sachin-sae.info/. Enter J018 in the search box to learn more about \"Migraine Headache: Care Instructions. \" Current as of: Heather 3, 2018 Content Version: 11.9 © 9608-7029 [employer]. Care instructions adapted under license by [employer] (which disclaims liability or warranty for this information).  If you have questions about a medical condition or this instruction, always ask your healthcare professional. [place] [address] any warranty or liability for your use of this information. Learning About Low-Carbohydrate Diets for Weight Loss What is a low-carbohydrate diet? Low-carb diets avoid foods that are high in carbohydrate. These high-carb foods include pasta, bread, rice, cereal, fruits, and starchy vegetables. Instead, these diets usually have you eat foods that are high in fat and protein. Many people lose weight quickly on a low-carb diet. But the early weight loss is water. People on this diet often gain the weight back after they start eating carbs again. Not all diet plans are safe or work well. A lot of the evidence shows that low-carb diets aren't healthy. That's because these diets often don't include healthy foods like fruits and vegetables. Losing weight safely means balancing protein, fat, and carbs with every meal and snack. And low-carb diets don't always provide the vitamins, minerals, and fiber you need. If you have a serious medical condition, talk to your doctor before you try any diet. These conditions include kidney disease, heart disease, type 2 diabetes, high cholesterol, and high blood pressure. If you are pregnant, it may not be safe for your baby if you are on a low-carb diet. How can you lose weight safely? You might have heard that a diet plan helped another person lose weight. But that doesn't mean that it will work for you. It is very hard to stay on a diet that includes lots of big changes in your eating habits. If you want to get to a healthy weight and stay there, making healthy lifestyle changes will often work better than dieting. These steps can help. · Make a plan for change. Work with your doctor to create a plan that is right for you. · See a dietitian. He or she can show you how to make healthy changes in your eating habits. · Manage stress. If you have a lot of stress in your life, it can be hard to focus on making healthy changes to your daily habits. · Track your food and activity. You are likely to do better at losing weight if you keep track of what you eat and what you do. Follow-up care is a key part of your treatment and safety. Be sure to make and go to all appointments, and call your doctor if you are having problems. It's also a good idea to know your test results and keep a list of the medicines you take. Where can you learn more? Go to http://sachin-sae.info/. Enter A121 in the search box to learn more about \"Learning About Low-Carbohydrate Diets for Weight Loss. \" Current as of: March 28, 2018 Content Version: 11.9 © 3782-8334 [employer]. Care instructions adapted under license by [employer] (which disclaims liability or warranty for this information). If you have questions about a medical condition or this instruction, always ask your healthcare professional. [address] any warranty or liability for your use of this information.

## 2019-05-08 NOTE — PROGRESS NOTES
HISTORY OF PRESENT ILLNESS Adam Segal is a 45 y.o. female. HPI: Here for follow up. Last visit started inderal for migraine headache prevention as she is having moderate intensity headaches generalize 4-5 days a week and last for few hours. Going on since long time. She was on topamax but was not helping so stopped. Now taking only otc as needed pain medication and helping very little for headaches. During visit mild headaches. Not associated with nausea or vomiting. No vision changes. No ext weakness. Feels light bothers her and sound bothers her. No radiation of headache. Also had cold sore last visit and improved with antiviral. 
Also recent labs showed vitamin D deficiency. Done with drisdol. For now advised otc vitamin D 3 supplement 2000 units. She understood it well. No unusual fatigue. Had elevated TSH. Adjusted dose and repeat lab showed TSh wnl. She is asymptomatic. No mood changes. No constipation or diarrhea. No weight or appetite changes. discussed high BMI. Discussed diet modification, calorie count and exercise. Discussed importance of weight loss. agree to do exercise and life style modification. Diet and exercise hand out given in [facility]. She does do walking as an exercise and trying to be more complaint with diet modification. Has hand skin pilling and dryness. Using lotion , Vaseline but not helping. No itching. Asking for dermatology referral.  
Visit Vitals /72 (BP 1 Location: Left arm, BP Patient Position: Sitting) Pulse 76 Temp 98.2 °F (36.8 °C) (Oral) Resp 16 Ht 5' 2\" (1.575 m) Wt 194 lb 12.8 oz (88.4 kg) SpO2 98% BMI 35.63 kg/m² Review medication list, vitals, problem list,allergies. Denies any headache, dizziness, no chest pain or trouble breathing, no arm or leg weakness. No nausea or vomiting, no weight or appetite changes, no mood changes .  No urine or bowel complains, no palpitation, no diaphoresis. No abdominal pain. No cold or cough. No leg swelling. No fever. No sleep trouble. Review labs again. Lab Results Component Value Date/Time WBC 7.3 08/29/2018 12:00 AM  
 HGB 14.3 08/29/2018 12:00 AM  
 HCT 43.4 08/29/2018 12:00 AM  
 PLATELET 780 74/52/1554 12:00 AM  
 MCV 89 08/29/2018 12:00 AM  
 
Lab Results Component Value Date/Time Sodium 141 01/28/2019 12:00 AM  
 Potassium 5.0 01/28/2019 12:00 AM  
 Chloride 103 01/28/2019 12:00 AM  
 CO2 24 01/28/2019 12:00 AM  
 Anion gap 15.0 10/15/2015 10:00 AM  
 Glucose 96 01/28/2019 12:00 AM  
 BUN 10 01/28/2019 12:00 AM  
 Creatinine 0.64 01/28/2019 12:00 AM  
 BUN/Creatinine ratio 16 01/28/2019 12:00 AM  
 GFR est  01/28/2019 12:00 AM  
 GFR est non- 01/28/2019 12:00 AM  
 Calcium 9.9 01/28/2019 12:00 AM  
 Bilirubin, total 0.3 01/28/2019 12:00 AM  
 AST (SGOT) 16 01/28/2019 12:00 AM  
 Alk. phosphatase 86 01/28/2019 12:00 AM  
 Protein, total 7.2 01/28/2019 12:00 AM  
 Albumin 4.6 01/28/2019 12:00 AM  
 Globulin 2.5 10/15/2015 10:00 AM  
 A-G Ratio 1.8 01/28/2019 12:00 AM  
 ALT (SGPT) 25 01/28/2019 12:00 AM  
 
Lab Results Component Value Date/Time Cholesterol, total 259 (H) 01/28/2019 12:00 AM  
 HDL Cholesterol 32 (L) 01/28/2019 12:00 AM  
 LDL, calculated 200 (H) 01/28/2019 12:00 AM  
 VLDL, calculated 27 01/28/2019 12:00 AM  
 Triglyceride 134 01/28/2019 12:00 AM  
 
Lab Results Component Value Date/Time TSH 2.870 05/06/2019 12:00 AM  
 
Lab Results Component Value Date/Time Hemoglobin A1c 5.9 (H) 08/29/2018 12:00 AM  
 
Lab Results Component Value Date/Time VITAMIN D, 25-HYDROXY 14.7 (L) 01/28/2019 12:00 AM  
   
Elevated LDL. On statin. No side effects. Denies any leg cramps or unusual fatigue. Tolerating statin well. She is working on exercise and diet modification. Also trying to loose weight.   
 
ROS: Denies any dizziness, no chest pain or trouble breathing, no arm or leg weakness. No nausea or vomiting, no weight or appetite changes, no mood changes . No urine or bowel complains, no palpitation, no diaphoresis. No abdominal pain. No cold or cough. No leg swelling. No fever. No sleep trouble. Physical Exam  
Constitutional: She is oriented to person, place, and time. No distress. Cardiovascular: Normal rate, regular rhythm and normal heart sounds. Pulmonary/Chest: CTA Abdominal: Soft. Bowel sounds are normal. There is no tenderness. Musculoskeletal: She exhibits no edema. Neurological: She is oriented to person, place, and time. Skin:  
Palm skin dryness. Pilling of skin. Non tender or itching. Psychiatric: Her behavior is normal.  
 
 
ASSESSMENT and PLAN 
  ICD-10-CM ICD-9-CM 1. Recent skin changes: sending to dermatology. Mean time continue lotion application. R23.9 782.8 REFERRAL TO DERMATOLOGY  
 hand dryness and skin pilling 2. Elevated TSH: adjusted dose and repeat lab showed tsh wnl. Will continue current dose of medication. R79.89 794.5 levothyroxine (SYNTHROID) 50 mcg tablet 3. Vitamin D deficiency: done with drisdol. For now otc vitamin D 3 2000 units daily. E55.9 268.9 4. Hyperlipidemia, unspecified hyperlipidemia type: started on statin. Tolerating it well. Working on healthy life style with diet modification and exercise. E78.5 272.4 5. Migraine without aura and without status migrainosus, not intractable: did not start inderal, topamax is not helping. Still headaches 4-5 days a week and last for hours. Will consider neurology referral.  G43.009 346.10 REFERRAL TO NEUROLOGY 6. Recurrent cold sores: improved with antiviral medication. B00.1 054.9 7. BMI 35.0-35.9,adult: discussed high BMI. See HPI  
 Z68.35 V85.35 Pt understood and agree with the plan Review hM Per nurse note had knee pain. Did not discuss during visit. Follow-up and Dispositions · Return in about 3 months (around 8/8/2019).

## 2019-05-08 NOTE — PROGRESS NOTES
1. Have you been to the ER, urgent care clinic since your last visit? Hospitalized since your last visit? No 
 
2. Have you seen or consulted any other health care providers outside of the [address] [place] [address] since your last visit? Include any pap smears or colon screening. No 
 
Chief Complaint Patient presents with  Cholesterol Problem  Thyroid Problem  Vitamin D Deficiency  Blood sugar problem  Migraine  Knee Pain  
  bilat knee pain getting worse with swelling at times - can hear squeaking sound when moving leg back and forth  Referral Request  
  dermatology - rash is getting worse on hands and never saw dermatology for consult Patient scored a 2 on depression screen; stated she has had loss of interest and depressed for five days out of last two weeks. She stated she sat in her room and cried and could not say what was wrong her.

## 2019-09-30 ENCOUNTER — OFFICE VISIT (OUTPATIENT)
Dept: FAMILY MEDICINE CLINIC | Age: 39
End: 2019-09-30

## 2019-09-30 VITALS
BODY MASS INDEX: 35.7 KG/M2 | SYSTOLIC BLOOD PRESSURE: 100 MMHG | RESPIRATION RATE: 16 BRPM | TEMPERATURE: 98.1 F | WEIGHT: 194 LBS | HEIGHT: 62 IN | HEART RATE: 72 BPM | OXYGEN SATURATION: 94 % | DIASTOLIC BLOOD PRESSURE: 68 MMHG

## 2019-09-30 DIAGNOSIS — E55.9 VITAMIN D DEFICIENCY: ICD-10-CM

## 2019-09-30 DIAGNOSIS — R73.01 IMPAIRED FASTING BLOOD SUGAR: ICD-10-CM

## 2019-09-30 DIAGNOSIS — Z23 ENCOUNTER FOR IMMUNIZATION: ICD-10-CM

## 2019-09-30 DIAGNOSIS — G43.009 MIGRAINE WITHOUT AURA AND WITHOUT STATUS MIGRAINOSUS, NOT INTRACTABLE: Primary | ICD-10-CM

## 2019-09-30 DIAGNOSIS — M25.561 PAIN IN BOTH KNEES, UNSPECIFIED CHRONICITY: ICD-10-CM

## 2019-09-30 DIAGNOSIS — L30.9 ECZEMA, UNSPECIFIED TYPE: ICD-10-CM

## 2019-09-30 DIAGNOSIS — M25.562 PAIN IN BOTH KNEES, UNSPECIFIED CHRONICITY: ICD-10-CM

## 2019-09-30 DIAGNOSIS — R06.02 EXERCISE-INDUCED SHORTNESS OF BREATH: ICD-10-CM

## 2019-09-30 DIAGNOSIS — B00.1 RECURRENT COLD SORES: ICD-10-CM

## 2019-09-30 DIAGNOSIS — E78.5 HYPERLIPIDEMIA, UNSPECIFIED HYPERLIPIDEMIA TYPE: ICD-10-CM

## 2019-09-30 DIAGNOSIS — E03.9 HYPOTHYROIDISM, UNSPECIFIED TYPE: ICD-10-CM

## 2019-09-30 RX ORDER — ALBUTEROL SULFATE 90 UG/1
2 AEROSOL, METERED RESPIRATORY (INHALATION)
Qty: 1 INHALER | Refills: 2 | Status: SHIPPED | OUTPATIENT
Start: 2019-09-30 | End: 2020-04-10 | Stop reason: SDUPTHER

## 2019-09-30 RX ORDER — VALACYCLOVIR HYDROCHLORIDE 1 G/1
TABLET, FILM COATED ORAL
Qty: 20 TAB | Refills: 0 | Status: SHIPPED | OUTPATIENT
Start: 2019-09-30 | End: 2020-10-05

## 2019-09-30 RX ORDER — DICLOFENAC SODIUM 10 MG/G
2 GEL TOPICAL 4 TIMES DAILY
Qty: 100 G | Refills: 0 | Status: SHIPPED | OUTPATIENT
Start: 2019-09-30 | End: 2020-10-05

## 2019-09-30 RX ORDER — BETAMETHASONE VALERATE 1.2 MG/G
CREAM TOPICAL
Refills: 1 | COMMUNITY
Start: 2019-06-28

## 2019-09-30 RX ORDER — ATORVASTATIN CALCIUM 20 MG/1
20 TABLET, FILM COATED ORAL DAILY
Qty: 30 TAB | Refills: 1 | Status: SHIPPED | OUTPATIENT
Start: 2019-09-30 | End: 2020-11-24 | Stop reason: SDUPTHER

## 2019-09-30 NOTE — PROGRESS NOTES
1. Have you been to the ER, urgent care clinic since your last visit? Hospitalized since your last visit? [facility] ED 8/14/19    2. Have you seen or consulted any other health care providers outside of the [address] [place] [address] since your last visit? Include any pap smears or colon screening.  [facility] LOV: 5/13/19      Chief Complaint   Patient presents with    Skin Problem     eczema rash on hands    Vitamin D Deficiency    Cholesterol Problem    Thyroid Problem    Migraine    Knee Pain     bilat knee pain x years - no pain today

## 2019-09-30 NOTE — PROGRESS NOTES
Patient presents for the following vaccines: Influenza Vaccine. Patient consent obtained by patient. Patient tolerated procedure well at left deltoid. Patient advised to remain in lobby for period of 10 minutes post injection to ensure no reaction. VIS given to patient.     Lot # : [id number]  Exp: 2020  MF [address]: [id number]

## 2019-09-30 NOTE — PROGRESS NOTES
HISTORY OF PRESENT ILLNESS  Conner Dominguez is a 45 y.o. female. HPI: here for follow up. H/o hypothyroidism. On supplement. Taking it with compliance. Asymptomatic. Denies any trouble swallowing. No change in voice. No weight or appetite changes. No unusual fatigue. No bowel movement changes. Also hyperlipidemia. Had high LDL. Was started on statin. Also started life style modification. Started walking and diet modification. No side effects of statin. C/o dry skin. H/o eczema. Mainly more over both hands and feet. No open skin but no and off pilling of skin and itching. She has seen dermatology in the past and using topical steroid cream as needed. Currently having worsening of problem. No rash or dry skin anywhere else. H/o hypertension. Vitals stable. Compliant with medication and no side effects. Asymptomatic. Prediabetes. Again working on life style modification. Visit Vitals  /68 (BP 1 Location: Left arm, BP Patient Position: Sitting)   Pulse 72   Temp 98.1 °F (36.7 °C) (Oral)   Resp 16   Ht 5' 2\" (1.575 m)   Wt 194 lb (88 kg)   SpO2 94%   BMI 35.48 kg/m²     Review medication list, vitals, problem list,allergies. Review labs. On vitamin D supplement otc.    Lab Results   Component Value Date/Time    WBC 7.3 08/29/2018 12:00 AM    HGB 14.3 08/29/2018 12:00 AM    HCT 43.4 08/29/2018 12:00 AM    PLATELET 839 58/75/5352 12:00 AM    MCV 89 08/29/2018 12:00 AM     Lab Results   Component Value Date/Time    Sodium 141 01/28/2019 12:00 AM    Potassium 5.0 01/28/2019 12:00 AM    Chloride 103 01/28/2019 12:00 AM    CO2 24 01/28/2019 12:00 AM    Anion gap 15.0 10/15/2015 10:00 AM    Glucose 96 01/28/2019 12:00 AM    BUN 10 01/28/2019 12:00 AM    Creatinine 0.64 01/28/2019 12:00 AM    BUN/Creatinine ratio 16 01/28/2019 12:00 AM    GFR est  01/28/2019 12:00 AM    GFR est non- 01/28/2019 12:00 AM    Calcium 9.9 01/28/2019 12:00 AM    Bilirubin, total 0.3 01/28/2019 12:00 AM    AST (SGOT) 16 01/28/2019 12:00 AM    Alk. phosphatase 86 01/28/2019 12:00 AM    Protein, total 7.2 01/28/2019 12:00 AM    Albumin 4.6 01/28/2019 12:00 AM    Globulin 2.5 10/15/2015 10:00 AM    A-G Ratio 1.8 01/28/2019 12:00 AM    ALT (SGPT) 25 01/28/2019 12:00 AM     Lab Results   Component Value Date/Time    Cholesterol, total 259 (H) 01/28/2019 12:00 AM    HDL Cholesterol 32 (L) 01/28/2019 12:00 AM    LDL, calculated 200 (H) 01/28/2019 12:00 AM    VLDL, calculated 27 01/28/2019 12:00 AM    Triglyceride 134 01/28/2019 12:00 AM     Lab Results   Component Value Date/Time    TSH 2.870 05/06/2019 12:00 AM     Lab Results   Component Value Date/Time    Hemoglobin A1c 5.9 (H) 08/29/2018 12:00 AM     Lab Results   Component Value Date/Time    VITAMIN D, 25-HYDROXY 14.7 (L) 01/28/2019 12:00 AM    Denies any headache, dizziness, no chest pain or trouble breathing, no arm or leg weakness. No nausea or vomiting, no weight or appetite changes, no mood changes . No urine or bowel complains, no palpitation, no diaphoresis. No abdominal pain. No cold or cough. No leg swelling. No fever. No sleep trouble. H/o migraine headaches. Stable. Not on any preventive medication. Done neurology appt. Coming up in oct. Bilateral knee pain. No swelling or redness. Meriam Mike present almost all the time. Walking and prolong standing makes it worse. Mild to moderate pain. No radiation. Taking otc motrin as needed for pain. Not using support to ambulate. ROS: see HPI     Physical Exam   Constitutional: She is oriented to person, place, and time. No distress. Neck: No thyromegaly present. Cardiovascular: Normal rate, regular rhythm and normal heart sounds. Pulmonary/Chest:   CTA   Abdominal: Soft. Bowel sounds are normal. There is no tenderness. Musculoskeletal: She exhibits no edema. Knee: bilateral: no swelling or redness. ROM wnl but crepitus on flexion. Lymphadenopathy:     She has no cervical adenopathy.    Neurological: She is oriented to person, place, and time. Skin: Skin is dry. Scattered skin pilling over palm of both hands and heals. No erythema or tenderness. No itching. No open sore. Psychiatric: Her behavior is normal.       ASSESSMENT and PLAN    ICD-10-CM ICD-9-CM    1. Migraine without aura and without status migrainosus, not intractable: symptomatic treatment. Topamax did not help. Propranolol did not try faithfully. Pending neurology appt. Will follow their recommendations. G43.009 346.10    2. Exercise-induced shortness of breath: for now stable. As needed medication. R06.02 786.05 albuterol (PROVENTIL HFA, VENTOLIN HFA, PROAIR HFA) 90 mcg/actuation inhaler   3. Recurrent cold sores: currently around lower lip. As needed antiviral.  B00.1 054.9 valACYclovir (VALTREX) 1 gram tablet   4. Encounter for immunization Z23 V03.89 INFLUENZA VIRUS VAC QUAD,SPLIT,PRESV FREE SYRINGE IM   5. Hypothyroidism, unspecified type: asymptomatic. Continue current dose of medication. E03.9 244.9    6. Vitamin D deficiency: repeat labs. Not on any supplement. E55.9 268.9 VITAMIN D, 25 HYDROXY   7. Hyperlipidemia, unspecified hyperlipidemia type: elevated LDL. On statin. Also working on life style modification. Recheck labs. E78.5 272.4 LIPID PANEL      METABOLIC PANEL, COMPREHENSIVE    elevated LDL    8. Impaired fasting blood sugar: again life style modification. Diet modification, walking as an exercise. Repeat labs. R73.01 790.21 HEMOGLOBIN A1C WITH EAG   9. Eczema, unspecified type: daily lotion. eucerine / vaseline. L30.9 692.9     hands and feet    10. Pain in both knees, unspecified chronicity: for now chronically. Crepitus on flexion bilaterally. No swelling or redness. For now obtain x-ray. Using NSAID. For now advised topical NSAID instead of oral and ice application.   M25.561 719.46 XR KNEE RT MAX 2 VWS    M25.562  XR KNEE LT MAX 2 VWS      diclofenac (VOLTAREN) 1 % gel   Pt understood and agree with the plan   Review hM Follow-up and Dispositions    · Return in about 3 months (around 12/30/2019).

## 2019-09-30 NOTE — PATIENT INSTRUCTIONS
Dermatitis: Care Instructions  Your Care Instructions  Dermatitis is the general name used for any rash or inflammation of the skin. Different kinds of dermatitis cause different kinds of rashes. Common causes of a rash include new medicines, plants (such as poison oak or poison ivy), heat, and stress. Certain illnesses can also cause a rash. An allergic reaction to something that touches your skin, such as latex, nickel, or poison ivy, is called contact dermatitis. Contact dermatitis may also be caused by something that irritates the skin, such as bleach, a chemical, or soap. These types of rashes cannot be spread from person to person. How long your rash will last depends on what caused it. Rashes may last a few days or months. Follow-up care is a key part of your treatment and safety. Be sure to make and go to all appointments, and call your doctor if you are having problems. It's also a good idea to know your test results and keep a list of the medicines you take. How can you care for yourself at home? · Do not scratch the rash. Cut your nails short, and file them smooth. Or wear gloves if this helps keep you from scratching. · Wash the area with water only. Pat dry. · Put cold, wet cloths on the rash to reduce itching. · Keep cool, and stay out of the sun. · Leave the rash open to the air as much as possible. · If the rash itches, use hydrocortisone cream. Follow the directions on the label. Calamine lotion may help for plant rashes. · Take an over-the-counter antihistamine, such as diphenhydramine (Benadryl) or loratadine (Claritin), to help calm the itching. Read and follow all instructions on the label. · If your doctor prescribed a cream, use it as directed. If your doctor prescribed medicine, take it exactly as directed. When should you call for help?   Call your doctor now or seek immediate medical care if:    · You have symptoms of infection, such as:  ? Increased pain, swelling, warmth, or redness. ? Red streaks leading from the area. ? Pus draining from the area. ? A fever.     · You have joint pain along with the rash.    Watch closely for changes in your health, and be sure to contact your doctor if:    · Your rash is changing or getting worse.     · You are not getting better as expected. Where can you learn more? Go to http://sachin-sae.info/. Enter [phone] in the search box to learn more about \"Dermatitis: Care Instructions. \"  Current as of: April 1, 2019  Content Version: 12.2  © 2529-9358 [employer]. Care instructions adapted under license by [employer] (which disclaims liability or warranty for this information). If you have questions about a medical condition or this instruction, always ask your healthcare professional. [address] any warranty or liability for your use of this information. Prediabetes: Care Instructions  Your Care Instructions    Prediabetes is a warning sign that you are at risk for getting type 2 diabetes. It means that your blood sugar is higher than it should be. The food you eat turns into sugar, which your body uses for energy. Normally, an organ called the pancreas makes insulin, which allows the sugar in your blood to get into your body's cells. But when your body can't use insulin the right way, the sugar doesn't move into cells. It stays in your blood instead. This is called insulin resistance. The buildup of sugar in the blood causes prediabetes. The good news is that lifestyle changes may help you get your blood sugar back to normal and help you avoid or delay diabetes. Follow-up care is a key part of your treatment and safety. Be sure to make and go to all appointments, and call your doctor if you are having problems. It's also a good idea to know your test results and keep a list of the medicines you take. How can you care for yourself at home? · Watch your weight.  A healthy weight helps your body use insulin properly. · Limit the amount of calories, sweets, and unhealthy fat you eat. Ask your doctor if you should see a dietitian. A registered dietitian can help you create meal plans that fit your lifestyle. · Get at least 30 minutes of exercise on most days of the week. Exercise helps control your blood sugar. It also helps you maintain a healthy weight. Walking is a good choice. You also may want to do other activities, such as running, swimming, cycling, or playing tennis or team sports. · Do not smoke. Smoking can make prediabetes worse. If you need help quitting, talk to your doctor about stop-smoking programs and medicines. These can increase your chances of quitting for good. · If your doctor prescribed medicines, take them exactly as prescribed. Call your doctor if you think you are having a problem with your medicine. You will get more details on the specific medicines your doctor prescribes. When should you call for help? Watch closely for changes in your health, and be sure to contact your doctor if:    · You have any symptoms of diabetes. These may include:  ? Being thirsty more often. ? Urinating more. ? Being hungrier. ? Losing weight. ? Being very tired. ? Having blurry vision.     · You have a wound that will not heal.     · You have an infection that will not go away.     · You have problems with your blood pressure.     · You want more information about diabetes and how you can keep from getting it. Where can you learn more? Go to http://sachin-sae.info/. Enter I222 in the search box to learn more about \"Prediabetes: Care Instructions. \"  Current as of: April 16, 2019  Content Version: 12.2  © 3821-1435 [employer]. Care instructions adapted under license by [employer] (which disclaims liability or warranty for this information).  If you have questions about a medical condition or this instruction, always ask your healthcare professional. [place] [address] any warranty or liability for your use of this information. Learning About Low-Carbohydrate Diets for Weight Loss  What is a low-carbohydrate diet? Low-carb diets avoid foods that are high in carbohydrate. These high-carb foods include pasta, bread, rice, cereal, fruits, and starchy vegetables. Instead, these diets usually have you eat foods that are high in fat and protein. Many people lose weight quickly on a low-carb diet. But the early weight loss is water. People on this diet often gain the weight back after they start eating carbs again. Not all diet plans are safe or work well. A lot of the evidence shows that low-carb diets aren't healthy. That's because these diets often don't include healthy foods like fruits and vegetables. Losing weight safely means balancing protein, fat, and carbs with every meal and snack. And low-carb diets don't always provide the vitamins, minerals, and fiber you need. If you have a serious medical condition, talk to your doctor before you try any diet. These conditions include kidney disease, heart disease, type 2 diabetes, high cholesterol, and high blood pressure. If you are pregnant, it may not be safe for your baby if you are on a low-carb diet. How can you lose weight safely? You might have heard that a diet plan helped another person lose weight. But that doesn't mean that it will work for you. It is very hard to stay on a diet that includes lots of big changes in your eating habits. If you want to get to a healthy weight and stay there, making healthy lifestyle changes will often work better than dieting. These steps can help. · Make a plan for change. Work with your doctor to create a plan that is right for you. · See a dietitian. He or she can show you how to make healthy changes in your eating habits. · Manage stress.  If you have a lot of stress in your life, it can be hard to focus on making healthy changes to your daily habits. · Track your food and activity. You are likely to do better at losing weight if you keep track of what you eat and what you do. Follow-up care is a key part of your treatment and safety. Be sure to make and go to all appointments, and call your doctor if you are having problems. It's also a good idea to know your test results and keep a list of the medicines you take. Where can you learn more? Go to http://sachin-sae.info/. Enter A121 in the search box to learn more about \"Learning About Low-Carbohydrate Diets for Weight Loss. \"  Current as of: November 7, 2018  Content Version: 12.2  © 0145-0704 [employer]. Care instructions adapted under license by [employer] (which disclaims liability or warranty for this information). If you have questions about a medical condition or this instruction, always ask your healthcare professional. [address] any warranty or liability for your use of this information. Learning About Low-Fat Eating  What is low-fat eating? Most food has some fat in it. Your body needs some fat to be healthy. But some kinds of fats are healthier than others. In a low-fat eating plan, you try to choose healthier fats and eat fewer unhealthy fats. Healthy fats include olive and canola oil. Try to avoid eating too much saturated fat (such as in cheese and meats) and trans fat (a type of fat found in many packaged snack foods and other baked goods). You do not need to cut all fat from your diet. But you can make healthier choices about the types and amount of fat you eat. Even though it is a good idea to choose healthier fats, it is still important to be careful of how much fat you eat, because all fats are high in calories. What are the different types of fats? Unhealthy fats  · Saturated fat.  Saturated fats are mostly in animal foods, such as meat and dairy foods. Tropical oils, such as coconut oil, palm oil, and cocoa butter, are also saturated fats. · Trans fat. Trans fats include shortening, partially hydrogenated vegetable oils, and hydrogenated vegetable oils. Trans fats are made when a liquid fat is turned into a solid fat (for example, when corn oil is made into stick margarine). They are in many processed foods, such as cookies, crackers, and snack foods. Healthy fats  · Monounsaturated fat. Monounsaturated fats are liquid at room temperature but get solid when refrigerated. Eating foods that are high in this fat may help lower your \"bad\" (LDL) cholesterol, keep your \"good\" (HDL) cholesterol level up, and lower your chances of getting coronary artery disease. This fat is found in canola oil, olive oil, peanut oil, olives, avocados, nuts, and nut butters. · Polyunsaturated fat. Polyunsaturated fats are liquid at room temperature. They are in safflower, sunflower, and corn oils. They are also the main fat in seafood. Omega-3 fatty acids are types of polyunsaturated fat. Eating fish may lower your chances of getting coronary artery disease. Fatty fish such as salmon and mackerel contain these healthy fatty acids. So do ground flaxseeds and flaxseed oil, soybeans, walnuts, and seeds. Why cut down on unhealthy fats? Eating foods that contain saturated fats can raise the LDL (\"bad\") cholesterol in your blood. Having a high level of LDL cholesterol increases your chance of hardening of the arteries (atherosclerosis), which can lead to heart disease, heart attack, and stroke. Trans fat raises the level of \"bad\" LDL cholesterol in your blood and may lower the \"good\" HDL cholesterol in your blood. Trans fat can raise your risk of heart disease, heart attack, and stroke. In general:  · No more than 10% of your daily calories should come from saturated fat. This is about 20 grams in a 2,000-calorie diet.   · No more than 10% of your daily calories should come from polyunsaturated fat. This is about 20 grams in a 2,000-calorie diet. · Monounsaturated fats can be up to 15% of your daily calories. This is about 25 to 30 grams in a 2,000-calorie diet. If you're not sure how much fat you should be eating or how many calories you need each day to stay at a healthy weight, talk to a registered dietitian. He or she can help you create a plan that's right for you. What can you do to cut down on fat? Foods like cheese, butter, sausage, and desserts can have a lot of unhealthy fats. Try these tips for healthier meals at home and when you eat out. At home  · Fill up on fruits, vegetables, and whole grains. · Think of meat as a side dish instead of as the main part of your meal.  · When you do eat meat, make it extra-lean ground beef (97% lean), ground turkey breast (without skin added), meats with fat trimmed off before cooking, or skinless chicken. · Try main dishes that use whole wheat pasta, brown rice, dried beans, or vegetables. · Use cooking methods that use little or no fat, such as broiling, steaming, or grilling. Use cooking spray instead of oil. If you use oil, use a monounsaturated oil, such as canola or olive oil. · Read food labels on canned, bottled, or packaged foods. Choose those with little saturated fat and no trans fat. When eating out at a restaurant  · Order foods that are broiled or poached instead of fried or breaded. · Cut back on the amount of butter or margarine that you use on bread. Use small amounts of olive oil instead. · Order sauces, gravies, and salad dressings on the side, and use only a little. · When you order pasta, choose tomato sauce instead of cream sauce. · Ask for salsa with your baked potato instead of sour cream, butter, cheese, or art. Where can you learn more? Go to http://sachin-sae.info/. Enter H477 in the search box to learn more about \"Learning About Low-Fat Eating. \"  Current as of: November 7, 2018  Content Version: 12.2  © 9524-3230 [employer]. Care instructions adapted under license by [employer] (which disclaims liability or warranty for this information). If you have questions about a medical condition or this instruction, always ask your healthcare professional. [address] any warranty or liability for your use of this information.

## 2019-10-03 ENCOUNTER — DOCUMENTATION ONLY (OUTPATIENT)
Dept: NEUROLOGY | Age: 39
End: 2019-10-03

## 2019-10-03 NOTE — PROGRESS NOTES
Chart review reveals scheduled new patient visit to discuss migraines as referred by Kitty Brandon MD.

## 2020-03-02 DIAGNOSIS — R79.89 ELEVATED TSH: ICD-10-CM

## 2020-03-02 NOTE — TELEPHONE ENCOUNTER
This patient contacted office for the following prescriptions to be filled:    Medication requested :   Requested Prescriptions     Pending Prescriptions Disp Refills    levothyroxine (SYNTHROID) 50 mcg tablet 30 Tab 0     Sig: Take 1 Tab by mouth Daily (before breakfast).      PCP: [email or web address]   Pharmacy or Print: [facility]  Mail order or Local pharmacy [phone]    Scheduled appointment if not seen by current providers in office: lov 9/30/2019

## 2020-03-03 RX ORDER — LEVOTHYROXINE SODIUM 50 UG/1
50 TABLET ORAL
Qty: 30 TAB | Refills: 0 | Status: SHIPPED | OUTPATIENT
Start: 2020-03-03 | End: 2020-04-21 | Stop reason: SDUPTHER

## 2020-04-10 DIAGNOSIS — R06.02 EXERCISE-INDUCED SHORTNESS OF BREATH: ICD-10-CM

## 2020-04-10 RX ORDER — ALBUTEROL SULFATE 90 UG/1
2 AEROSOL, METERED RESPIRATORY (INHALATION)
Qty: 1 INHALER | Refills: 2 | Status: SHIPPED | OUTPATIENT
Start: 2020-04-10

## 2020-04-10 NOTE — TELEPHONE ENCOUNTER
This patient contacted office for the following prescriptions to be filled:    Medication requested :   Requested Prescriptions     Pending Prescriptions Disp Refills    albuterol (PROVENTIL HFA, VENTOLIN HFA, PROAIR HFA) 90 mcg/actuation inhaler 1 Inhaler 2     Sig: Take 2 Puffs by inhalation every four (4) hours as needed for Wheezing.      PCP: Braulio Engel or Print: [facility]  Mail order or Local pharmacy [facility]    Scheduled appointment if not seen by current providers in office:  LOV 9/30/2019 f/u 7/1/2020

## 2020-04-21 DIAGNOSIS — R79.89 ELEVATED TSH: ICD-10-CM

## 2020-04-21 RX ORDER — LEVOTHYROXINE SODIUM 50 UG/1
50 TABLET ORAL
Qty: 90 TAB | Refills: 0 | Status: SHIPPED | OUTPATIENT
Start: 2020-04-21 | End: 2020-09-18 | Stop reason: SDUPTHER

## 2020-04-21 NOTE — TELEPHONE ENCOUNTER
This patient contacted office for the following prescriptions to be filled:    Medication requested :   Requested Prescriptions     Pending Prescriptions Disp Refills    levothyroxine (SYNTHROID) 50 mcg tablet 30 Tab 0     Sig: Take 1 Tab by mouth Daily (before breakfast).      PCP: Braulio Engel or Print: [facility]  Mail order or Local pharmacy [facility]    Scheduled appointment if not seen by current providers in office:  LOV 9/18/2019 f/u 7/1/2020

## 2020-09-18 DIAGNOSIS — R79.89 ELEVATED TSH: ICD-10-CM

## 2020-09-18 RX ORDER — LEVOTHYROXINE SODIUM 50 UG/1
50 TABLET ORAL
Qty: 90 TAB | Refills: 1 | Status: SHIPPED | OUTPATIENT
Start: 2020-09-18 | End: 2021-06-25 | Stop reason: SDUPTHER

## 2020-09-18 NOTE — TELEPHONE ENCOUNTER
This patient contacted office for the following prescriptions to be filled:    Medication requested :   Requested Prescriptions     Pending Prescriptions Disp Refills    levothyroxine (SYNTHROID) 50 mcg tablet 90 Tab 0     Sig: Take 1 Tab by mouth Daily (before breakfast).      PCP:  Braulio Engel or Print:  [facility]   Mail order or Local pharmacy [facility]     Scheduled appointment if not seen by current providers in office:  LOV 9/30/2019  F/u 10/5/2020

## 2020-10-05 ENCOUNTER — VIRTUAL VISIT (OUTPATIENT)
Dept: FAMILY MEDICINE CLINIC | Age: 40
End: 2020-10-05
Payer: MEDICAID

## 2020-10-05 DIAGNOSIS — E55.9 VITAMIN D DEFICIENCY: ICD-10-CM

## 2020-10-05 DIAGNOSIS — E03.9 HYPOTHYROIDISM, UNSPECIFIED TYPE: ICD-10-CM

## 2020-10-05 DIAGNOSIS — E78.00 ELEVATED LDL CHOLESTEROL LEVEL: ICD-10-CM

## 2020-10-05 DIAGNOSIS — R73.01 IMPAIRED FASTING BLOOD SUGAR: ICD-10-CM

## 2020-10-05 DIAGNOSIS — G43.009 MIGRAINE WITHOUT AURA AND WITHOUT STATUS MIGRAINOSUS, NOT INTRACTABLE: ICD-10-CM

## 2020-10-05 DIAGNOSIS — R73.01 IMPAIRED FASTING BLOOD SUGAR: Primary | ICD-10-CM

## 2020-10-05 PROCEDURE — 99214 OFFICE O/P EST MOD 30 MIN: CPT | Performed by: FAMILY MEDICINE

## 2020-10-05 NOTE — PROGRESS NOTES
Hoda Silver is a 44 y.o. female who was seen by synchronous (real-time) audio-video technology on 10/5/2020 for Follow-up        Assessment & Plan:   Diagnoses and all orders for this visit:  Impaired fasting blood sugar: Overdue for follow-up. Last labs done more than a year ago. She been not compliant with lifestyle modification. Discussed the importance of healthy lifestyle. We will recheck the labs    -     METABOLIC PANEL, COMPREHENSIVE; Future  -     HEMOGLOBIN A1C WITH EAG; Future  -     CBC W/O DIFF; Future    Vitamin D deficiency: Not taking any supplement. Will recheck the labs  -     METABOLIC PANEL, COMPREHENSIVE; Future  -     VITAMIN D, 25 HYDROXY; Future    Hypothyroidism, unspecified type: Been taking medication. Overdue for labs. Asymptomatic. Will recheck labs-     TSH 3RD GENERATION; Future  -     METABOLIC PANEL, COMPREHENSIVE; Future    Migraine without aura and without status migrainosus, not intractable: Has been stable at this time. Will observe  -     METABOLIC PANEL, COMPREHENSIVE; Future    Elevated LDL cholesterol level: Last LDL was around 200. Overdue for follow-up and labs. Not much compliant with the lifestyle modification or diet modification. Discussed the importance of diet modification, exercise and weight loss. We will recheck the labs in follow-up after results. Not taking statin since long time.  -     LIPID PANEL; Future  -     METABOLIC PANEL, COMPREHENSIVE; Future    Patient understood and agree with the plan  She was advised to get the flu vaccine from the pharmacy or the nurse visit at the office. She will be due for the Pap smear soon. Last one was done at the [facility] in [place]. I do not see any Pap result. She was advised to make appointment for the physical to get the Pap smear done  712  Subjective:     Done visit through doxy night  Noncompliant and overdue for follow-up  History of elevated LDL around 200.   Not taking statin with compliance. Also not much compliant with the diet modification. Discussed the importance of taking medication and lifestyle modification. She was advised to restart the medication and we will recheck the labs. She was sitting comfortable and did not appear in any acute distress during the virtual visit. History of hypothyroidism. Currently asymptomatic. Taking Synthroid with compliance. Denies any headache, dizziness, no chest pain or trouble breathing, no arm or leg weakness. No nausea or vomiting, no weight or appetite changes, no mood changes . No urine or bowel complains, no palpitation, no diaphoresis. No abdominal pain. No cold or cough. No leg swelling. No fever. No sleep trouble. History of migraine headaches. Currently fairly stable. No concern. Prediabetes. Again noncompliant with the lifestyle modification and diet modification. Lab Results   Component Value Date/Time    WBC 7.3 08/29/2018 12:00 AM    HGB 14.3 08/29/2018 12:00 AM    HCT 43.4 08/29/2018 12:00 AM    PLATELET 340 45/33/4654 12:00 AM    MCV 89 08/29/2018 12:00 AM     Lab Results   Component Value Date/Time    Sodium 141 01/28/2019 12:00 AM    Potassium 5.0 01/28/2019 12:00 AM    Chloride 103 01/28/2019 12:00 AM    CO2 24 01/28/2019 12:00 AM    Anion gap 15.0 10/15/2015 10:00 AM    Glucose 96 01/28/2019 12:00 AM    BUN 10 01/28/2019 12:00 AM    Creatinine 0.64 01/28/2019 12:00 AM    BUN/Creatinine ratio 16 01/28/2019 12:00 AM    GFR est  01/28/2019 12:00 AM    GFR est non- 01/28/2019 12:00 AM    Calcium 9.9 01/28/2019 12:00 AM    Bilirubin, total 0.3 01/28/2019 12:00 AM    Alk.  phosphatase 86 01/28/2019 12:00 AM    Protein, total 7.2 01/28/2019 12:00 AM    Albumin 4.6 01/28/2019 12:00 AM    Globulin 2.5 10/15/2015 10:00 AM    A-G Ratio 1.8 01/28/2019 12:00 AM    ALT (SGPT) 25 01/28/2019 12:00 AM    AST (SGOT) 16 01/28/2019 12:00 AM     Lab Results   Component Value Date/Time    Cholesterol, total 259 (H) 01/28/2019 12:00 AM    HDL Cholesterol 32 (L) 01/28/2019 12:00 AM    LDL, calculated 200 (H) 01/28/2019 12:00 AM    VLDL, calculated 27 01/28/2019 12:00 AM    Triglyceride 134 01/28/2019 12:00 AM     Lab Results   Component Value Date/Time    TSH 2.870 05/06/2019 12:00 AM     Lab Results   Component Value Date/Time    Hemoglobin A1c 5.9 (H) 08/29/2018 12:00 AM     Lab Results   Component Value Date/Time    VITAMIN D, 25-HYDROXY 14.7 (L) 01/28/2019 12:00 AM           Prior to Admission medications    Medication Sig Start Date End Date Taking? Authorizing Provider   levothyroxine (SYNTHROID) 50 mcg tablet Take 1 Tab by mouth Daily (before breakfast). 9/18/20  Yes Di Castaneda MD   levonorgestrel (MIRENA) 20 mcg/24 hr IUD 1 Each by IntraUTERine route once. Yes Provider, Historical   albuterol (PROVENTIL HFA, VENTOLIN HFA, PROAIR HFA) 90 mcg/actuation inhaler Take 2 Puffs by inhalation every four (4) hours as needed for Wheezing. 4/10/20   Di Castaneda MD   betamethasone valerate (VALISONE) 0.1 % topical cream APPLY TWICE DAILY FOR THE AFFECTED AREAS OF THE HANDS AND LEFT FOOT 6/28/19   Provider, Historical   atorvastatin (LIPITOR) 20 mg tablet Take 1 Tab by mouth daily. 9/30/19   Di Castaneda MD   valACYclovir Brandon Emile) 1 gram tablet 1 gm daily for 3 days with acute episode of cold sore 9/30/19 10/5/20  Di Castaneda MD   OTHER Keto supplement - 2 capsules every morning  10/5/20  Provider, Historical   diclofenac (VOLTAREN) 1 % gel Apply 2 g to affected area four (4) times daily. 9/30/19 10/5/20  Di Castaneda MD   acetaminophen (TYLENOL ARTHRITIS PAIN) 650 mg CR tablet Take 650 mg by mouth every six (6) hours as needed for Pain. Provider, Historical   ibuprofen (MOTRIN) 400 mg tablet Take 1 Tab by mouth every eight (8) hours as needed for Pain.  10/12/15   Di Castaneda MD   butalbital-acetaminophen-caffeine (FIORICET, ESGIC) -40 mg per tablet Take 1 Tab by mouth every four (4) hours as needed for Pain. 10/12/15 10/5/20  Angie Roberto MD     Patient Active Problem List    Diagnosis Date Noted    Hypothyroidism 09/17/2018    Vitamin D deficiency 09/17/2018    Severe obesity (BMI 35.0-39.9) 08/29/2018    Migraine without aura and without status migrainosus, not intractable 02/05/2016    Recurrent cold sores 02/05/2016    Prediabetes 10/27/2015    Hyperlipidemia 07/09/2012     Current Outpatient Medications   Medication Sig Dispense Refill    levothyroxine (SYNTHROID) 50 mcg tablet Take 1 Tab by mouth Daily (before breakfast). 90 Tab 1    levonorgestrel (MIRENA) 20 mcg/24 hr IUD 1 Each by IntraUTERine route once.  albuterol (PROVENTIL HFA, VENTOLIN HFA, PROAIR HFA) 90 mcg/actuation inhaler Take 2 Puffs by inhalation every four (4) hours as needed for Wheezing. 1 Inhaler 2    betamethasone valerate (VALISONE) 0.1 % topical cream APPLY TWICE DAILY FOR THE AFFECTED AREAS OF THE HANDS AND LEFT FOOT  1    atorvastatin (LIPITOR) 20 mg tablet Take 1 Tab by mouth daily. 30 Tab 1    acetaminophen (TYLENOL ARTHRITIS PAIN) 650 mg CR tablet Take 650 mg by mouth every six (6) hours as needed for Pain.  ibuprofen (MOTRIN) 400 mg tablet Take 1 Tab by mouth every eight (8) hours as needed for Pain. 60 Tab 0     Allergies   Allergen Reactions    Premarin [Conjugated Estrogens] Rash    Adhesive Tape-Silicones Rash     Mild rash/itching    Talc Shortness of Breath     SOB and wheezing     Past Medical History:   Diagnosis Date    Fibromyalgia     per rheum    GERD (gastroesophageal reflux disease)     Headache(784.0)     History of echocardiogram 02/04/2004    [facility]:  EF 0.73. No WMA. Normal echo.  History of herpes genitalis     History of palpitations 2004    Described as irregular heartbeats.  Holter monitor 10/26/2015    Sinus rhythm, avg HR 82 bpm (range  bpm). One 5-beat SVT event w/suspected atrial tachycardia. No symptoms reported.     Hypercholesterolemia     Hypothyroidism     Shortness of breath 2004    Associated with palpitations. Social History     Tobacco Use    Smoking status: Current Every Day Smoker     Packs/day: 0.50     Years: 15.00     Pack years: 7.50     Start date: 11/1/2015    Smokeless tobacco: Never Used    Tobacco comment: former quit date 10/16/15, but uses non-tobacco vaporizer   Substance Use Topics    Alcohol use: Yes     Alcohol/week: 0.0 standard drinks     Comment: rarely       ROS    Objective:   No flowsheet data found. General: alert, cooperative, no distress   Mental  status: normal mood, behavior, speech, dress, motor activity, and thought processes, able to follow commands   HENT: NCAT   Neck: no visualized mass   Resp: no respiratory distress   Neuro: no gross deficits   Skin: no discoloration or lesions of concern on visible areas   Psychiatric: normal affect, consistent with stated mood, no evidence of hallucinations     Additional exam findings: We discussed the expected course, resolution and complications of the diagnosis(es) in detail. Medication risks, benefits, costs, interactions, and alternatives were discussed as indicated. I advised her to contact the office if her condition worsens, changes or fails to improve as anticipated. She expressed understanding with the diagnosis(es) and plan. Sandoval Koehler, who was evaluated through a patient-initiated, synchronous (real-time) audio-video encounter, and/or her healthcare decision maker, is aware that it is a billable service, with coverage as determined by her insurance carrier. She provided verbal consent to proceed: Yes, and patient identification was verified. It was conducted pursuant to the emergency declaration under the [address] [place] [address] and the [employer] Act. A caregiver was present when appropriate.  Ability to conduct physical exam was limited. I was in the office.  The patient was at home.  : See HPI    Jahaira Bianchi MD

## 2020-11-23 ENCOUNTER — HOSPITAL ENCOUNTER (OUTPATIENT)
Dept: LAB | Age: 40
Discharge: HOME | End: 2020-11-23

## 2020-11-23 LAB — SENTARA SPECIMEN COL,SENBCF: NORMAL

## 2020-11-23 PROCEDURE — 99001 SPECIMEN HANDLING PT-LAB: CPT

## 2020-11-24 DIAGNOSIS — E78.00 ELEVATED LDL CHOLESTEROL LEVEL: Primary | ICD-10-CM

## 2020-11-24 DIAGNOSIS — E55.9 VITAMIN D DEFICIENCY: ICD-10-CM

## 2020-11-24 DIAGNOSIS — R73.01 IMPAIRED FASTING BLOOD SUGAR: ICD-10-CM

## 2020-11-24 LAB
25(OH)D3 SERPL-MCNC: 25.2 NG/ML (ref 32–100)
A-G RATIO,AGRAT: 1.8 RATIO (ref 1.1–2.6)
ALBUMIN SERPL-MCNC: 4.4 G/DL (ref 3.5–5)
ALP SERPL-CCNC: 76 U/L (ref 25–115)
ALT SERPL-CCNC: 27 U/L (ref 5–40)
ANION GAP SERPL CALC-SCNC: 12 MMOL/L (ref 3–15)
AST SERPL W P-5'-P-CCNC: 20 U/L (ref 10–37)
AVG GLU, 10930: 135 MG/DL (ref 91–123)
BILIRUB SERPL-MCNC: 0.3 MG/DL (ref 0.2–1.2)
BUN SERPL-MCNC: 12 MG/DL (ref 6–22)
CALCIUM SERPL-MCNC: 9.5 MG/DL (ref 8.4–10.5)
CHLORIDE SERPL-SCNC: 104 MMOL/L (ref 98–110)
CHOLEST SERPL-MCNC: 251 MG/DL (ref 110–200)
CO2 SERPL-SCNC: 21 MMOL/L (ref 20–32)
CREAT SERPL-MCNC: 0.6 MG/DL (ref 0.5–1.2)
ERYTHROCYTE [DISTWIDTH] IN BLOOD BY AUTOMATED COUNT: 13.1 % (ref 10–15.5)
GFRAA, 66117: >60
GFRNA, 66118: >60
GLOBULIN,GLOB: 2.5 G/DL (ref 2–4)
GLUCOSE SERPL-MCNC: 127 MG/DL (ref 70–99)
HBA1C MFR BLD HPLC: 6.3 % (ref 4.8–5.6)
HCT VFR BLD AUTO: 45.2 % (ref 35.1–46.5)
HDLC SERPL-MCNC: 29 MG/DL
HDLC SERPL-MCNC: 8.7 MG/DL (ref 0–5)
HGB BLD-MCNC: 13.8 G/DL (ref 11.7–15.5)
LDL/HDL RATIO,LDHD: 6
LDLC SERPL CALC-MCNC: 173 MG/DL (ref 50–99)
MCH RBC QN AUTO: 28 PG (ref 26–34)
MCHC RBC AUTO-ENTMCNC: 31 G/DL (ref 31–36)
MCV RBC AUTO: 92 FL (ref 81–99)
NON-HDL CHOLESTEROL, 011976: 222 MG/DL
PLATELET # BLD AUTO: 322 K/UL (ref 140–440)
PMV BLD AUTO: 10.4 FL (ref 9–13)
POTASSIUM SERPL-SCNC: 4.4 MMOL/L (ref 3.5–5.5)
PROT SERPL-MCNC: 6.9 G/DL (ref 6.4–8.3)
RBC # BLD AUTO: 4.9 M/UL (ref 3.8–5.2)
SODIUM SERPL-SCNC: 137 MMOL/L (ref 133–145)
TRIGL SERPL-MCNC: 247 MG/DL (ref 40–149)
TSH SERPL DL<=0.005 MIU/L-ACNC: 1.83 MCU/ML (ref 0.27–4.2)
VLDLC SERPL CALC-MCNC: 49 MG/DL (ref 8–30)
WBC # BLD AUTO: 7.7 K/UL (ref 4–11)

## 2020-11-24 RX ORDER — ATORVASTATIN CALCIUM 40 MG/1
40 TABLET, FILM COATED ORAL
Qty: 90 TAB | Refills: 0 | Status: SHIPPED | OUTPATIENT
Start: 2020-11-24 | End: 2021-06-25 | Stop reason: SDUPTHER

## 2020-11-24 NOTE — PROGRESS NOTES
Still elevated lipid panel. Some improvement in LDL and elevated triglyceride. Continue diet modification and will go up on statin dose. Recheck labs in 3 months. If not much improvement will consider sending her to cardiology for further management. Also A1C went up , still in prediabetic range. Again low carb diet. Low vitamin D. Can take otc 2000 units daily. Will recheck labs together in 3 months.

## 2020-12-02 ENCOUNTER — TELEPHONE (OUTPATIENT)
Dept: FAMILY MEDICINE CLINIC | Age: 40
End: 2020-12-02

## 2020-12-03 NOTE — TELEPHONE ENCOUNTER
Contacted patient and verified identity using name and date of birth (2- identifiers)   Spokewiht patient and she verbalized understanding of Still elevated lipid panel. Some improvement in LDL and elevated triglyceride. Continue diet modification and will go up on statin dose. Recheck labs in 3 months. If not much improvement will consider sending her to cardiology for further management. Also A1C went up , still in prediabetic range. Again low carb diet. Low vitamin D. Can take otc 2000 units daily. Will recheck labs together in 3 months. She stated that she has not been taking her cholesterol medication in quite some time but will resume now. She also reported to quit smoking 10/6/2020. Congratulated patient.

## 2021-02-24 DIAGNOSIS — R73.01 IMPAIRED FASTING BLOOD SUGAR: ICD-10-CM

## 2021-02-24 DIAGNOSIS — E78.00 ELEVATED LDL CHOLESTEROL LEVEL: ICD-10-CM

## 2021-02-24 DIAGNOSIS — E55.9 VITAMIN D DEFICIENCY: ICD-10-CM

## 2021-06-25 DIAGNOSIS — R79.89 ELEVATED TSH: ICD-10-CM

## 2021-06-25 DIAGNOSIS — E78.00 ELEVATED LDL CHOLESTEROL LEVEL: ICD-10-CM

## 2021-06-25 RX ORDER — LEVOTHYROXINE SODIUM 50 UG/1
50 TABLET ORAL
Qty: 90 TABLET | Refills: 1 | Status: SHIPPED | OUTPATIENT
Start: 2021-06-25

## 2021-06-25 RX ORDER — ATORVASTATIN CALCIUM 40 MG/1
40 TABLET, FILM COATED ORAL
Qty: 90 TABLET | Refills: 0 | Status: SHIPPED | OUTPATIENT
Start: 2021-06-25

## 2021-06-25 NOTE — TELEPHONE ENCOUNTER
This patient contacted office for the following prescriptions to be filled:    Medication requested :    Requested Prescriptions     Pending Prescriptions Disp Refills    levothyroxine (SYNTHROID) 50 mcg tablet 90 Tablet 1     Sig: Take 1 Tablet by mouth Daily (before breakfast).  atorvastatin (LIPITOR) 40 mg tablet 90 Tablet 0     Sig: Take 1 Tablet by mouth nightly.      PCP: Braulio Engel or Print: Rhys  Mail order or Local pharmacy [facility]     Scheduled appointment if not seen by current providers in office:  LOV 10/5/2020 f/u 8/6/2021

## 2021-08-06 ENCOUNTER — OFFICE VISIT (OUTPATIENT)
Dept: FAMILY MEDICINE CLINIC | Age: 41
End: 2021-08-06

## 2021-08-06 VITALS
SYSTOLIC BLOOD PRESSURE: 112 MMHG | RESPIRATION RATE: 16 BRPM | TEMPERATURE: 99 F | OXYGEN SATURATION: 96 % | HEIGHT: 62 IN | HEART RATE: 82 BPM | DIASTOLIC BLOOD PRESSURE: 80 MMHG | BODY MASS INDEX: 42.33 KG/M2 | WEIGHT: 230 LBS

## 2021-08-06 DIAGNOSIS — G43.009 MIGRAINE WITHOUT AURA AND WITHOUT STATUS MIGRAINOSUS, NOT INTRACTABLE: ICD-10-CM

## 2021-08-06 DIAGNOSIS — R73.01 IMPAIRED FASTING BLOOD SUGAR: ICD-10-CM

## 2021-08-06 DIAGNOSIS — L60.9 NAIL ABNORMALITIES: ICD-10-CM

## 2021-08-06 DIAGNOSIS — E78.00 PURE HYPERCHOLESTEROLEMIA: ICD-10-CM

## 2021-08-06 DIAGNOSIS — E55.9 VITAMIN D DEFICIENCY: ICD-10-CM

## 2021-08-06 DIAGNOSIS — E03.9 HYPOTHYROIDISM, UNSPECIFIED TYPE: Primary | ICD-10-CM

## 2021-08-06 PROCEDURE — 99214 OFFICE O/P EST MOD 30 MIN: CPT | Performed by: FAMILY MEDICINE

## 2021-08-06 RX ORDER — AMITRIPTYLINE HYDROCHLORIDE 10 MG/1
10 TABLET, FILM COATED ORAL
Qty: 30 TABLET | Refills: 1 | Status: SHIPPED | OUTPATIENT
Start: 2021-08-06

## 2021-08-06 NOTE — PROGRESS NOTES
HISTORY OF PRESENT ILLNESS  Annetta Weller is a 36 y.o. female. HPI: Here for follow-up. History of hypothyroidism. No trouble swallowing or change in voice. No recent weight or appetite change or bowel movement concern. Taking medication with compliance. Last labs showed mildly elevated TSH. At this time she is asymptomatic and will repeat labs. Noted on prior labs elevated lipid panel. She is not much compliant with lifestyle modification. Discussed high BMI and the importance of diet modification and exercise. She will work on it. Prediabetes. A1c around 6.2. Repeat the labs. Again discussed the importance of lifestyle modification and she will work on it  Nail abnormality. Over the right thumbnail hyperpigmented changes under the nail bed since January. Also broken and brittle nail destruction over the right middle fingernail. No itching or pain. Sending to dermatology. Meantime advised Katelyn . Lately having a frontal/allover headaches. No cold cough or wheezing. No sinus congestion. Headaches are 3-4 times a week and last for hours. No associated nausea vomiting but some time vision change. No dizziness. Sitting comfortable without any acute distress. Asymptomatic during visit. Taking Tylenol and Motrin helps but takes longer time to reduce the headache. Visit Vitals  /80 (BP 1 Location: Left arm, BP Patient Position: Sitting, BP Cuff Size: Adult)   Pulse 82   Temp 99 °F (37.2 °C) (Oral)   Resp 16   Ht 5' 2\" (1.575 m)   Wt 230 lb (104.3 kg)   SpO2 96%   BMI 42.07 kg/m²     Review medication list, vitals, problem list,allergies.    Lab Results   Component Value Date/Time    WBC 7.7 11/23/2020 09:07 AM    HGB 13.8 11/23/2020 09:07 AM    HCT 45.2 11/23/2020 09:07 AM    PLATELET 717 42/52/7983 09:07 AM    MCV 92 11/23/2020 09:07 AM     Lab Results   Component Value Date/Time    Sodium 137 11/23/2020 09:07 AM    Potassium 4.4 11/23/2020 09:07 AM    Chloride 104 11/23/2020 09:07 AM    CO2 21 11/23/2020 09:07 AM    Anion gap 12.0 11/23/2020 09:07 AM    Glucose 127 (H) 11/23/2020 09:07 AM    BUN 12 11/23/2020 09:07 AM    Creatinine 0.6 11/23/2020 09:07 AM    BUN/Creatinine ratio 16 01/28/2019 12:00 AM    GFR est  01/28/2019 12:00 AM    GFR est non- 01/28/2019 12:00 AM    Calcium 9.5 11/23/2020 09:07 AM    Bilirubin, total 0.3 11/23/2020 09:07 AM    Alk. phosphatase 76 11/23/2020 09:07 AM    Protein, total 6.9 11/23/2020 09:07 AM    Albumin 4.4 11/23/2020 09:07 AM    Globulin 2.5 11/23/2020 09:07 AM    A-G Ratio 1.8 11/23/2020 09:07 AM    ALT (SGPT) 27 11/23/2020 09:07 AM    AST (SGOT) 20 11/23/2020 09:07 AM     Lab Results   Component Value Date/Time    Cholesterol, total 251 (H) 11/23/2020 09:07 AM    HDL Cholesterol 29 (L) 11/23/2020 09:07 AM    LDL, calculated 173 (H) 11/23/2020 09:07 AM    VLDL, calculated 49 (H) 11/23/2020 09:07 AM    Triglyceride 247 (H) 11/23/2020 09:07 AM     Lab Results   Component Value Date/Time    TSH 1.83 11/23/2020 09:07 AM     Lab Results   Component Value Date/Time    Hemoglobin A1c 6.3 (H) 11/23/2020 09:07 AM     Lab Results   Component Value Date/Time    VITAMIN D, 25-HYDROXY 25.2 (L) 11/23/2020 09:07 AM     She is taking vitamin D supplement        ROS:Denies any headache, dizziness, no chest pain or trouble breathing, no arm or leg weakness. No nausea or vomiting, no weight or appetite changes, no mood changes . No urine or bowel complains, no palpitation, no diaphoresis. No abdominal pain. No cold or cough. No leg swelling. No fever. No sleep trouble. Physical Exam  Constitutional:       General: She is not in acute distress. Cardiovascular:      Rate and Rhythm: Normal rate and regular rhythm. Heart sounds: Normal heart sounds. Abdominal:      General: Bowel sounds are normal.      Palpations: Abdomen is soft. Tenderness: There is no abdominal tenderness. Musculoskeletal:         General: No swelling.       Cervical back: Neck supple. Skin:     Comments: Right hand fingernails destruction over the middle finger nail, hyperpigmentation under thumb nail bed. Neurological:      Mental Status: She is oriented to person, place, and time. Psychiatric:         Behavior: Behavior normal.         ASSESSMENT and PLAN    ICD-10-CM ICD-9-CM    1. Hypothyroidism, unspecified type: Recheck labs. Continue current dose and further adjustment after lab result E03.9 244.9 TSH 3RD GENERATION   2. Vitamin D deficiency: Continue supplement. Will recheck labs E55.9 268.9 VITAMIN D, 25 HYDROXY   3. Migraine without aura and without status migrainosus, not intractable: Lately been having headaches mild to moderate intensity 3-4 times a week. Last for hours. No associated nausea vomiting but sometimes vision change. No extremity weakness. Currently she is symptom-free. Taking Tylenol or Motrin takes longer time to help. I have discussed the Topamax which she has tried in the past and did not help. Agreed to take the Elavil. Discussed medication side effects. Follow-up next visit N63.442 624.60 METABOLIC PANEL, COMPREHENSIVE      amitriptyline (ELAVIL) 10 mg tablet   4. Pure hypercholesterolemia: Diet modification were discussed and recheck labs E78.00 272.0 LIPID PANEL   5. Impaired fasting blood sugar: Diet modification and lifestyle modification discussed and recheck labs R73.01 790.21 HEMOGLOBIN A1C WITH EAG      CBC W/O DIFF   6. Nail abnormalities: See HPI. Sending to dermatology L60.9 703.9 REFERRAL TO DERMATOLOGY   Pt understood and agree with the plan   Review hM    Follow-up and Dispositions    · Return in about 3 months (around 11/6/2021) for need an appt for physical with pap . Please note that this dictation was completed with [employer], the [employer] voice recognition software. Quite often unanticipated grammatical, syntax, homophones, and other interpretive errors are inadvertently transcribed by the computer software. Please disregard these errors. Please excuse any errors that have escaped final proofreading.

## 2021-08-06 NOTE — PROGRESS NOTES
Chief Complaint   Patient presents with    Other     Pre-DM    Cholesterol Problem    Thyroid Problem    Vitamin D Deficiency     1. Have you been to the ER, urgent care clinic since your last visit? Hospitalized since your last visit? Urgent [facility] for Covid testing    2. Have you seen or consulted any other health care providers outside of the [facility] since your last visit? Include any pap smears or colon screening.  No

## 2021-08-06 NOTE — PATIENT INSTRUCTIONS
Migraine Headache: Care Instructions  Overview     Migraines are painful, throbbing headaches that often start on one side of the head. They may cause nausea and vomiting and make you sensitive to light, sound, or smell. Without treatment, migraines can last from 4 hours to a few days. Medicines can help prevent migraines or stop them after they have started. Your doctor can help you find which ones work best for you. Follow-up care is a key part of your treatment and safety. Be sure to make and go to all appointments, and call your doctor if you are having problems. It's also a good idea to know your test results and keep a list of the medicines you take. How can you care for yourself at home? · Do not drive if you have taken a prescription pain medicine. · Rest in a quiet, dark room until your headache is gone. Close your eyes, and try to relax or go to sleep. Don't watch TV or read. · Put a cold, moist cloth or cold pack on the painful area for 10 to 20 minutes at a time. Put a thin cloth between the cold pack and your skin. · Use a warm, moist towel or a heating pad set on low to relax tight shoulder and neck muscles. · Have someone gently massage your neck and shoulders. · Take your medicines exactly as prescribed. Call your doctor if you think you are having a problem with your medicine. You will get more details on the specific medicines your doctor prescribes. · Don't take medicine for headache pain too often. Talk to your doctor if you are taking medicine more than 2 days a week to stop a headache. Taking too much pain medicine can lead to more headaches. These are called medicine-overuse headaches. To prevent migraines  · Keep a headache diary so you can figure out what triggers your headaches. Avoiding triggers may help you prevent headaches. Record when each headache began, how long it lasted, and what the pain was like.  Write down any other symptoms you had with the headache, such as nausea, flashing lights or dark spots, or sensitivity to bright light or loud noise. Note if the headache occurred near your period. List anything that might have triggered the headache. Triggers may include certain foods (chocolate, cheese, wine) or odors, smoke, bright light, stress, or lack of sleep. · If your doctor has prescribed medicine for your migraines, take it as directed. You may have medicine that you take only when you get a migraine and medicine that you take all the time to help prevent migraines. ? If your doctor has prescribed medicine for when you get a headache, take it at the first sign of a migraine, unless your doctor has given you other instructions. ? If your doctor has prescribed medicine to prevent migraines, take it exactly as prescribed. Call your doctor if you think you are having a problem with your medicine. · Find healthy ways to deal with stress. Migraines are most common during or right after stressful times. Try finding ways to reduce stress like practicing mindfulness or deep breathing exercises. · Get plenty of sleep and exercise. But be careful to not push yourself too hard during exercise. It may trigger a headache. · Eat meals on a regular schedule. Avoid foods and drinks that often trigger migraines. These include chocolate, alcohol (especially red wine and port), aspartame, monosodium glutamate (MSG), and some additives found in foods (such as hot dogs, art, cold cuts, aged cheeses, and pickled foods). · Limit caffeine. Don't drink too much coffee, tea, or soda. But don't quit caffeine suddenly. That can also give you migraines. · Do not smoke or allow others to smoke around you. If you need help quitting, talk to your doctor about stop-smoking programs and medicines. These can increase your chances of quitting for good. · If you are taking birth control pills or hormone therapy, talk to your doctor about whether they are triggering your migraines.   When should you call for help? Call 911 anytime you think you may need emergency care. For example, call if:    · You have signs of a stroke. These may include:  ? Sudden numbness, paralysis, or weakness in your face, arm, or leg, especially on only one side of your body. ? Sudden vision changes. ? Sudden trouble speaking. ? Sudden confusion or trouble understanding simple statements. ? Sudden problems with walking or balance. ? A sudden, severe headache that is different from past headaches. Call your doctor now or seek immediate medical care if:    · You have new or worse nausea and vomiting.     · You have a new or higher fever.     · Your headache gets much worse. Watch closely for changes in your health, and be sure to contact your doctor if:    · You are not getting better after 2 days (48 hours). Where can you learn more? Go to http://sachinWhatsersae.info/  Enter V390 in the search box to learn more about \"Migraine Headache: Care Instructions. \"  Current as of: August 4, 2020               Content Version: 12.8  © 2006-2021 [employer]. Care instructions adapted under license by [employer] (which disclaims liability or warranty for this information). If you have questions about a medical condition or this instruction, always ask your healthcare professional. [address] any warranty or liability for your use of this information. Learning About Low-Fat Eating  What is low-fat eating? Most food has some fat in it. Your body needs some fat to be healthy. But some kinds of fats are healthier than others. In a low-fat eating plan, you try to choose healthier fats and eat fewer unhealthy fats. Healthy fats include olive and canola oil. Try to avoid eating too much saturated fat, such as in cheese and meats. You do not need to cut all fat from your diet.  But you can make healthier choices about the types and amount of fat you eat.  Even though it is a good idea to choose healthier fats, it is still important to be careful of how much fat you eat, because all fats are high in calories. What are the different types of fats? Unhealthy fat  · Saturated fat. Saturated fats are mostly in animal foods, such as meat and dairy foods. Tropical oils, such as coconut oil, palm oil, and cocoa butter, are also saturated fats. Healthy fats  · Monounsaturated fat. Monounsaturated fats are liquid at room temperature but get solid when refrigerated. Eating foods that are high in this fat may help lower your \"bad\" (LDL) cholesterol, keep your \"good\" (HDL) cholesterol level up, and lower your chances of getting coronary artery disease. This fat is found in canola oil, olive oil, peanut oil, olives, avocados, nuts, and nut butters. · Polyunsaturated fat. Polyunsaturated fats are liquid at room temperature. They are in safflower, sunflower, and corn oils. They are also the main fat in seafood. Omega-3 fatty acids are types of polyunsaturated fat. Eating fish may lower your chances of getting coronary artery disease. Fatty fish such as salmon and mackerel contain these healthy fatty acids. So do ground flaxseeds and flaxseed oil, soybeans, walnuts, and seeds. Why cut down on unhealthy fats? Eating foods that contain saturated fats can raise the LDL (\"bad\") cholesterol in your blood. Having a high level of LDL cholesterol increases your chance of hardening of the arteries (atherosclerosis), which can lead to heart disease, heart attack, and stroke. In general:  · No more than 10% of your daily calories should come from saturated fat. This is about 20 grams in a 2,000-calorie diet. · No more than 10% of your daily calories should come from polyunsaturated fat. This is about 20 grams in a 2,000-calorie diet. · Monounsaturated fats can be up to 15% of your daily calories. This is about 25 to 30 grams in a 2,000-calorie diet.   If you're not sure how much fat you should be eating or how many calories you need each day to stay at a healthy weight, talk to a registered dietitian. A dietitian can help you create a plan that's right for you. What can you do to cut down on fat? Foods like cheese, butter, sausage, and desserts can have a lot of unhealthy fats. Try these tips for healthier meals at home and when you eat out. At home  · Fill up on fruits, vegetables, and whole grains. · Think of meat as a side dish instead of as the main part of your meal.  · When you do eat meat, make it extra-lean ground beef (97% lean), ground turkey breast (without skin added), meats with fat trimmed off before cooking, or skinless chicken. · Try main dishes that use whole wheat pasta, brown rice, dried beans, or vegetables. · Use cooking methods that use little or no fat, such as broiling, steaming, or grilling. Use cooking spray instead of oil. If you use oil, use a monounsaturated oil, such as canola or olive oil. · Read food labels on canned, bottled, or packaged foods. Choose those with little saturated fat. When eating out at a restaurant  · Order foods that are broiled or poached instead of fried or breaded. · Cut back on the amount of butter or margarine that you use on bread. Use small amounts of olive oil instead. · Order sauces, gravies, and salad dressings on the side, and use only a little. · When you order pasta, choose tomato sauce instead of cream sauce. · Ask for salsa with your baked potato instead of sour cream, butter, cheese, or art. Where can you learn more? Go to http://www.gray.com/  Enter [id number] in the search box to learn more about \"Learning About Low-Fat Eating. \"  Current as of: December 17, 2020               Content Version: 12.8  © 4474-3186 [employer]. Care instructions adapted under license by [employer] (which disclaims liability or warranty for this information).  If you have questions about a medical condition or this instruction, always ask your healthcare professional. [address] any warranty or liability for your use of this information. Prediabetes: Care Instructions  Overview     Prediabetes is a warning sign that you're at risk for getting type 2 diabetes. It means that your blood sugar is higher than it should be. But it's not high enough to be diabetes. The food you eat naturally turns into sugar. Your body uses the sugar for energy. Normally, an organ called the pancreas makes insulin. And insulin allows the sugar in your blood to get into your body's cells. But sometimes the body can't use insulin the right way. So the sugar stays in your blood instead. This is called insulin resistance. The buildup of sugar in your blood means you have prediabetes. The good news is that you may be able to prevent or delay diabetes. Making small lifestyle changes, like getting active and changing your eating habits, may help you get your blood sugar back to normal. You can work with your doctor to make a treatment plan. Follow-up care is a key part of your treatment and safety. Be sure to make and go to all appointments, and call your doctor if you are having problems. It's also a good idea to know your test results and keep a list of the medicines you take. How can you care for yourself at home? · Watch your weight. A healthy weight helps your body use insulin properly. · Limit the amount of calories, sweets, and unhealthy fat you eat. Ask your doctor if you should see a dietitian. A registered dietitian can help you create meal plans that fit your lifestyle. · Get at least 30 minutes of exercise on most days of the week. Exercise helps control your blood sugar. It also helps you maintain a healthy weight. Walking is a good choice. You also may want to do other activities, such as running, swimming, cycling, or playing tennis or team sports. · Do not smoke. Smoking can make prediabetes worse. If you need help quitting, talk to your doctor about stop-smoking programs and medicines. These can increase your chances of quitting for good. · If your doctor prescribed medicines, take them exactly as prescribed. Call your doctor if you think you are having a problem with your medicine. You will get more details on the specific medicines your doctor prescribes. When should you call for help? Watch closely for changes in your health, and be sure to contact your doctor if:    · You have any symptoms of diabetes. These may include:  ? Being thirsty more often. ? Urinating more. ? Being hungrier. ? Losing weight. ? Being very tired. ? Having blurry vision.     · You have a wound that will not heal.     · You have an infection that will not go away.     · You have problems with your blood pressure.     · You want more information about diabetes and how you can keep from getting it. Where can you learn more? Go to http://www.gray.com/  Enter I222 in the search box to learn more about \"Prediabetes: Care Instructions. \"  Current as of: August 31, 2020               Content Version: 12.8  © 9875-8406 [email or web address]. Care instructions adapted under license by [employer] (which disclaims liability or warranty for this information). If you have questions about a medical condition or this instruction, always ask your healthcare professional. [address] any warranty or liability for your use of this information. Toenail Fungus: Care Instructions  Overview  A nail that is infected by a fungus usually turns white or yellow. As the fungus spreads, the nail turns a darker color and gets thicker. And the nail edges start to turn ragged and crumble. A bad infection can cause pain, and the nail may pull away from the toe or finger.   Nails that are exposed to moisture and warmth a lot are more likely to get infected by a fungus. This can happen from wearing sweaty shoes often and from walking barefoot on shower floors. Or it can happen if you share personal things, such as towels and nail clippers. It's hard to treat nail fungus. And the infection can return after it has cleared up. But medicines can sometimes get rid of nail fungus for good. If the infection is very bad, or if it causes a lot of pain, you may need to have the nail removed. Follow-up care is a key part of your treatment and safety. Be sure to make and go to all appointments, and call your doctor if you are having problems. It's also a good idea to know your test results and keep a list of the medicines you take. How can you care for yourself at home? · Take your medicines exactly as prescribed. Call your doctor if you have any problems with your medicine. · If your doctor gave you a cream or liquid to put on your nail, use it exactly as directed. · Wash your hands and feet often, and wash your hands after touching your feet. · Keep your nails clean and dry. Dry your feet completely after you bathe and before you put on shoes and socks. · Keep your nails trimmed. · Change socks often. Wear dry socks that absorb moisture. · Don't go barefoot in public places. · Use a spray or powder that fights fungus on your feet and in your shoes. · Don't pick at the skin around your nails. · Don't use nail polish or fake nails on your nails. · Don't share personal things, such as towels and nail clippers. When should you call for help? Call your doctor now or seek immediate medical care if:    · You have signs of infection, such as:  ? Increased pain, swelling, warmth, or redness. ? Red streaks leading from the site. ? Pus draining from the site. ? A fever.     · You have new or increased toe pain. Watch closely for changes in your health, and be sure to contact your doctor if:    · You do not get better as expected.    Where can you learn more?  Go to http://www.gray.com/  Enter D202 in the search box to learn more about \"Toenail Fungus: Care Instructions. \"  Current as of: July 2, 2020               Content Version: 12.8  © 7183-0592 Healthwise, [facility]. Care instructions adapted under license by [employer] (which disclaims liability or warranty for this information). If you have questions about a medical condition or this instruction, always ask your healthcare professional. [place] [address] any warranty or liability for your use of this information.

## 2022-05-26 DIAGNOSIS — R79.89 ELEVATED TSH: ICD-10-CM

## 2022-05-26 NOTE — TELEPHONE ENCOUNTER
This patient contacted office for the following prescriptions to be filled:    Last office visit: 8/6/2021  Follow up appointment: Pt will call and schedule once her new ins kicks in   Medication requested :   Requested Prescriptions     Pending Prescriptions Disp Refills    levothyroxine (SYNTHROID) 50 mcg tablet 90 Tablet 1     Sig: Take 1 Tablet by mouth Daily (before breakfast).      PCP: Meredith Alfonso order or Local pharmacy name [facility] [address] [phone]

## 2022-05-27 NOTE — TELEPHONE ENCOUNTER
LOV: 8/06/21 - Follow up appointment: Pt will call and schedule once her new insurance kicks in     Last labs: 11/23/2020  Last refill: 6/25/21

## 2022-05-31 RX ORDER — LEVOTHYROXINE SODIUM 50 UG/1
50 TABLET ORAL
Qty: 90 TABLET | Refills: 1 | OUTPATIENT
Start: 2022-05-31

## 2022-09-16 ENCOUNTER — TELEPHONE (OUTPATIENT)
Dept: FAMILY MEDICINE CLINIC | Age: 42
End: 2022-09-16

## 2022-09-16 NOTE — TELEPHONE ENCOUNTER
----- Message from Liz Barron sent at 9/15/2022  1:31 PM EDT -----  Subject: Message to Provider    QUESTIONS  Information for Provider? PT is requesting blood work orders be put in her   chart , and then would like a call to schedule labs and a follow up. Pt   requesting call back please.   ---------------------------------------------------------------------------  --------------  Kim Rai [facility]  [phone]; OK to leave message on voicemail  ---------------------------------------------------------------------------  --------------  SCRIPT ANSWERS  Relationship to Patient?  Self

## 2022-09-16 NOTE — TELEPHONE ENCOUNTER
Dr. Araujo, patient is requesting lab orders to complete prior to a follow-up appointment with you. Please advise. Thank you.

## 2022-09-19 DIAGNOSIS — R73.01 IMPAIRED FASTING BLOOD SUGAR: ICD-10-CM

## 2022-09-19 DIAGNOSIS — E78.00 PURE HYPERCHOLESTEROLEMIA: ICD-10-CM

## 2022-09-19 DIAGNOSIS — E03.8 OTHER SPECIFIED HYPOTHYROIDISM: Primary | ICD-10-CM

## 2022-09-19 DIAGNOSIS — E55.9 VITAMIN D DEFICIENCY: ICD-10-CM

## 2022-09-19 NOTE — TELEPHONE ENCOUNTER
Spoke with patient (two identifiers verified) to advise Dr. Jude Gutierrez has ordered fasting labs. She was advised to have nothing to eat or drink 8-10 hours prior to labs except water. Patient verbalized understanding.